# Patient Record
Sex: FEMALE | Race: WHITE | NOT HISPANIC OR LATINO | Employment: OTHER | URBAN - METROPOLITAN AREA
[De-identification: names, ages, dates, MRNs, and addresses within clinical notes are randomized per-mention and may not be internally consistent; named-entity substitution may affect disease eponyms.]

---

## 2020-10-14 ENCOUNTER — TELEPHONE (OUTPATIENT)
Dept: ENDOCRINOLOGY | Facility: CLINIC | Age: 55
End: 2020-10-14

## 2020-10-14 RX ORDER — INSULIN LISPRO 100 [IU]/ML
INJECTION, SUSPENSION SUBCUTANEOUS
Start: 2020-10-14 | End: 2021-01-20

## 2020-10-14 NOTE — TELEPHONE ENCOUNTER
PT STATED SHE WILL START TAKING  35 UNITS OF HUMALOG PER CHIDI RUSSELL. THE PT JUST WANTED TO LET US KNOW.

## 2021-01-20 ENCOUNTER — OFFICE VISIT (OUTPATIENT)
Dept: ENDOCRINOLOGY | Facility: CLINIC | Age: 56
End: 2021-01-20

## 2021-01-20 VITALS — WEIGHT: 162 LBS | HEIGHT: 63 IN | BODY MASS INDEX: 28.7 KG/M2

## 2021-01-20 DIAGNOSIS — E11.65 TYPE 2 DIABETES MELLITUS WITH HYPERGLYCEMIA, WITH LONG-TERM CURRENT USE OF INSULIN (HCC): Primary | Chronic | ICD-10-CM

## 2021-01-20 DIAGNOSIS — E89.0 POSTSURGICAL HYPOTHYROIDISM: Chronic | ICD-10-CM

## 2021-01-20 DIAGNOSIS — R53.83 FATIGUE, UNSPECIFIED TYPE: ICD-10-CM

## 2021-01-20 DIAGNOSIS — Z79.4 TYPE 2 DIABETES MELLITUS WITH HYPERGLYCEMIA, WITH LONG-TERM CURRENT USE OF INSULIN (HCC): Primary | Chronic | ICD-10-CM

## 2021-01-20 PROCEDURE — 99443 PR PHYS/QHP TELEPHONE EVALUATION 21-30 MIN: CPT | Performed by: PHYSICIAN ASSISTANT

## 2021-01-20 RX ORDER — METFORMIN HYDROCHLORIDE 500 MG/1
500 TABLET, EXTENDED RELEASE ORAL 2 TIMES DAILY
Qty: 60 TABLET | Refills: 3 | Status: SHIPPED | OUTPATIENT
Start: 2021-01-20 | End: 2021-02-15 | Stop reason: SINTOL

## 2021-01-20 RX ORDER — PREGABALIN 300 MG/1
300 CAPSULE ORAL 2 TIMES DAILY
COMMUNITY
End: 2021-03-04 | Stop reason: SDUPTHER

## 2021-01-20 RX ORDER — LISINOPRIL 5 MG/1
5 TABLET ORAL DAILY
COMMUNITY

## 2021-01-20 RX ORDER — PROMETHAZINE HYDROCHLORIDE 25 MG/1
25 TABLET ORAL AS NEEDED
COMMUNITY
End: 2022-10-20 | Stop reason: ALTCHOICE

## 2021-01-20 RX ORDER — ISOSORBIDE MONONITRATE 120 MG/1
120 TABLET, EXTENDED RELEASE ORAL DAILY
COMMUNITY

## 2021-01-20 RX ORDER — CHLORTHALIDONE 25 MG/1
12.5 TABLET ORAL DAILY
COMMUNITY

## 2021-01-20 RX ORDER — TRAZODONE HYDROCHLORIDE 150 MG/1
150 TABLET ORAL NIGHTLY
COMMUNITY

## 2021-01-20 RX ORDER — LEVOTHYROXINE SODIUM 112 UG/1
110 TABLET ORAL DAILY
COMMUNITY
End: 2022-10-20 | Stop reason: SDUPTHER

## 2021-01-20 RX ORDER — METOPROLOL TARTRATE 100 MG/1
100 TABLET ORAL 2 TIMES DAILY
COMMUNITY

## 2021-01-20 RX ORDER — INSULIN LISPRO 100 [IU]/ML
INJECTION, SUSPENSION SUBCUTANEOUS
Qty: 30 ML | Refills: 3 | Status: SHIPPED | OUTPATIENT
Start: 2021-01-20 | End: 2021-02-15

## 2021-01-20 NOTE — PROGRESS NOTES
"     Office Note      Date: 2021  Patient Name: Mimi Aguayo  MRN: [id number]  : 1965    Chief Complaint   Patient presents with   • Follow-up   • Diabetes     You have chosen to receive care through a telephone visit. Do you consent to use a telephone visit for your medical care today? Yes    History of Present Illness:   Mimi Aguayo is a 55 y.o. female who presents today for type 2 diabetes and postsurgical hypothyroidism.  She remains on Humalog 75/25.  She is taking 35-38 units twice daily.  She reports that she is testing FSBS 3-4 times per day.  She reports readings have typically been 180-260.  She denies any hypoglycemia.  She stopped drinking beverages with sugar.  She reports that she has improved her diet.  She has decreased carbs.  She reports that she has been trying to stay active and walking near her home.  She reports that she ran out of insulin over the weekend.  She reports that FSBS were better in the 150 range over the weekend when she did not take insulin.  She denies any change in diet over increase in physical activity.  She remains on levothyroxine 112 mcg/day.  She reports taking this correctly and regularly.  She notes fatigue.  She reports history of diarrhea since bowel surgery.  Eye exam due.  Lipids checked with cardiologist.    Subjective      The following portions of the patient's history were reviewed and updated as appropriate: allergies, current medications, past family history, past medical history, past social history, past surgical history and problem list.    Objective     Vitals:    21 1607   Weight: 73.5 kg (162 lb)   Height: 160 cm (63\")   PainSc:   6     Body mass index is 28.7 kg/m².    Physical Exam      Current Outpatient Medications   Medication Instructions   • chlorthalidone (HYGROTON) 12.5 mg, Oral, Daily   • Insulin Lispro Prot & Lispro (HumaLOG Mix 75/25 KwikPen) (75-25) 100 UNIT/ML suspension pen-injector pen 40 units " twice daily with meals   • isosorbide mononitrate (IMDUR) 120 mg, Oral, Daily   • levothyroxine (SYNTHROID, LEVOTHROID) 112 mcg, Oral, Daily   • lisinopril (PRINIVIL,ZESTRIL) 5 mg, Oral, Daily   • metFORMIN ER (GLUCOPHAGE-XR) 500 mg, Oral, 2 times daily   • metoprolol tartrate (LOPRESSOR) 100 mg, Oral, 2 Times Daily   • pregabalin (LYRICA) 300 mg, Oral, 2 Times Daily   • promethazine (PHENERGAN) 25 mg, Oral, As Needed   • traZODone (DESYREL) 150 mg, Oral, Nightly       Assessment / Plan      Assessment & Plan:  1. Type 2 diabetes mellitus with hyperglycemia, with long-term current use of insulin (CMS/[facility])  FSBS increased, above goal.  Encouraged to keep working on healthy dietary choices and regular physical activity.  Trial of metformin ER - start with 1 daily, then increase to 1 twice daily if tolerated.  Continue Humalog 75/25.  Advised to call if not tolerating or blood sugars remain elevated.  - metFORMIN ER (GLUCOPHAGE-XR) 500 MG 24 hr tablet; Take 1 tablet by mouth 2 (two) times a day.  Dispense: 60 tablet; Refill: 3  - Insulin Lispro Prot & Lispro (HumaLOG Mix 75/25 KwikPen) (75-25) 100 UNIT/ML suspension pen-injector pen; 40 units twice daily with meals  Dispense: 30 mL; Refill: 3  - Hemoglobin A1c; Future  - Comprehensive Metabolic Panel; Future    2. Postsurgical hypothyroidism  Continue levothyroxine.  Check TSH.  - levothyroxine (SYNTHROID, LEVOTHROID) 112 MCG tablet; Take 112 mcg by mouth Daily.  - TSH; Future    3. Fatigue, unspecified type  Check labs.  - Vitamin B12; Future  - Folate; Future  - CBC (No Diff); Future    Lab order mailed to have these done near her home.  Will notify her of results.    27 minutes spent on phone with patient.    Return in about 3 months (around 4/20/2021) for Next scheduled follow up.     ADELFO Hartman  01/20/2021

## 2021-02-15 ENCOUNTER — TELEPHONE (OUTPATIENT)
Dept: ENDOCRINOLOGY | Facility: CLINIC | Age: 56
End: 2021-02-15

## 2021-02-15 DIAGNOSIS — E11.65 TYPE 2 DIABETES MELLITUS WITH HYPERGLYCEMIA, WITH LONG-TERM CURRENT USE OF INSULIN (HCC): Chronic | ICD-10-CM

## 2021-02-15 DIAGNOSIS — Z79.4 TYPE 2 DIABETES MELLITUS WITH HYPERGLYCEMIA, WITH LONG-TERM CURRENT USE OF INSULIN (HCC): Chronic | ICD-10-CM

## 2021-02-15 RX ORDER — TRIAMCINOLONE ACETONIDE 5 MG/G
OINTMENT TOPICAL 2 TIMES DAILY
Qty: 15 G | Refills: 0 | Status: SHIPPED | OUTPATIENT
Start: 2021-02-15

## 2021-02-15 RX ORDER — INSULIN LISPRO 100 [IU]/ML
INJECTION, SUSPENSION SUBCUTANEOUS
Start: 2021-02-15 | End: 2022-10-20 | Stop reason: SDUPTHER

## 2021-02-15 NOTE — TELEPHONE ENCOUNTER
See below - where to schedule patient? She would like an appointment next week.  Not able to come in this week due to the weather.

## 2021-02-15 NOTE — TELEPHONE ENCOUNTER
Spoke with patient.  Had left great toenail removed in October.  Nail bed never healed.  She has redness/purple around the toe.  Now her right great toe is swollen, toe is red, draining pus from the nail bed.  Saw Dr. Lovell, podiatry last week.  He drained the toe and she was started on Keflex.  Could not come today due to travel with the weather and requested an appointment next week.  States the podiatrist feels like the problem is stemming from her diabetes.

## 2021-02-15 NOTE — TELEPHONE ENCOUNTER
Spoke with pt.  She reports seeing podiatrist - Dr. Lovell in [place].  She had left 1st toenail removed in October.  She reports this hasn't completely healed yet.  She reports developing infection right 1st toe, base of nail.  She reports that this is draining and has pus.  She reports that she developed rash due to keflex, so she was changed to cipro 4 days ago on Thursday 2/11.  She reports that toe appears dark red/deep purple/brown.  She reports having 'red polka dots' on the bottom of her feet for the past couple of months.  She reports that dermatologist did not know cause.  She reports being diagnosed with 'eczema or psoriasis' on legs by dermatologist one week ago.  She reports that 2 are size of silver dollars, others are smaller.  She reports that clobetasol not covered for her, but pharmacist told her triamcinolone was covered.  She cannot get hold of dermatologist for different Rx and asks if I will send this in.  She reports that she did not tolerate metformin due to diarrhea.  She reports recent readings ac breakfast and ac dinner 160-180. Advised to increase insulin to 44 units BID.  She reports that she is unable to come to [place] this week due to weather.  Will see if I can get her in sooner (this week) to wound care in [place].  They were closed this afternoon, but will try back tomorrow.  Advised to go to ER if symptoms worsens or develops fever.

## 2021-02-15 NOTE — TELEPHONE ENCOUNTER
PT CALLED STATING THAT HER BIG TOES ON BOTH FEET ARE INFECTED BADLY. SHE WANTED TO COME IN NEXT WEEK BUT [facility] IS BOOKED. PLEASE CONFER W/ DR ABOUT BEST COURSE OF ACTION AND REACH OUT TO PT. THANK YOU

## 2021-02-17 ENCOUNTER — TELEPHONE (OUTPATIENT)
Dept: ENDOCRINOLOGY | Facility: CLINIC | Age: 56
End: 2021-02-17

## 2021-02-17 NOTE — TELEPHONE ENCOUNTER
Called patient to check in and see if there had been any change with her feet.  We were out of the office yesterday due to weather.  She reports that right toe is still draining, but clear now instead of pus.  She reports that left toe has not improved.  Called Wound Care at [facility] in [place].  Moved her appointment up for 2 days from now - Friday at 1:30 PM.

## 2021-03-04 DIAGNOSIS — Z79.4 TYPE 2 DIABETES MELLITUS WITH HYPERGLYCEMIA, WITH LONG-TERM CURRENT USE OF INSULIN (HCC): Primary | Chronic | ICD-10-CM

## 2021-03-04 DIAGNOSIS — E11.65 TYPE 2 DIABETES MELLITUS WITH HYPERGLYCEMIA, WITH LONG-TERM CURRENT USE OF INSULIN (HCC): Primary | Chronic | ICD-10-CM

## 2021-03-04 RX ORDER — PREGABALIN 300 MG/1
300 CAPSULE ORAL 2 TIMES DAILY
Qty: 60 CAPSULE | Refills: 1 | Status: SHIPPED | OUTPATIENT
Start: 2021-03-04 | End: 2021-05-07 | Stop reason: SDUPTHER

## 2021-03-25 ENCOUNTER — TELEPHONE (OUTPATIENT)
Dept: ENDOCRINOLOGY | Facility: CLINIC | Age: 56
End: 2021-03-25

## 2021-03-25 NOTE — TELEPHONE ENCOUNTER
PATIENT STATES THAT SHE HAD LAB WORK DONE SEVERAL WEEKS AGO AND HAS NOT HEARD BACK FROM THIS OFFICE WITH THE RESULTS    CALL BACK [phone]

## 2021-03-26 NOTE — TELEPHONE ENCOUNTER
Please let her know that A1c was high at 8.4%.  Ask her to call/send in readings if blood sugars are still staying over 140.  Thyroid level was normal.  Kidney tests, electrolytes, and liver tests were okay.  Vitamin B12 level was on the low side.  Recommend that she start a vitamin B12 supplement of 1000 mcg daily until her next appointment.  She is not anemic.  White blood cell count was elevated - may be due to infection.  I recommend that she follow-up with her primary care doctor about this.  Thanks.

## 2021-05-07 DIAGNOSIS — E11.65 TYPE 2 DIABETES MELLITUS WITH HYPERGLYCEMIA, WITH LONG-TERM CURRENT USE OF INSULIN (HCC): Chronic | ICD-10-CM

## 2021-05-07 DIAGNOSIS — Z79.4 TYPE 2 DIABETES MELLITUS WITH HYPERGLYCEMIA, WITH LONG-TERM CURRENT USE OF INSULIN (HCC): Chronic | ICD-10-CM

## 2021-05-07 RX ORDER — PREGABALIN 300 MG/1
300 CAPSULE ORAL 2 TIMES DAILY
Qty: 60 CAPSULE | Refills: 1 | Status: SHIPPED | OUTPATIENT
Start: 2021-05-07 | End: 2021-07-07

## 2021-07-06 DIAGNOSIS — Z79.4 TYPE 2 DIABETES MELLITUS WITH HYPERGLYCEMIA, WITH LONG-TERM CURRENT USE OF INSULIN (HCC): Chronic | ICD-10-CM

## 2021-07-06 DIAGNOSIS — E11.65 TYPE 2 DIABETES MELLITUS WITH HYPERGLYCEMIA, WITH LONG-TERM CURRENT USE OF INSULIN (HCC): Chronic | ICD-10-CM

## 2021-07-07 RX ORDER — PREGABALIN 300 MG/1
CAPSULE ORAL
Qty: 60 CAPSULE | Refills: 1 | Status: SHIPPED | OUTPATIENT
Start: 2021-07-07 | End: 2021-09-02 | Stop reason: SDUPTHER

## 2021-09-02 DIAGNOSIS — Z79.4 TYPE 2 DIABETES MELLITUS WITH HYPERGLYCEMIA, WITH LONG-TERM CURRENT USE OF INSULIN (HCC): Chronic | ICD-10-CM

## 2021-09-02 DIAGNOSIS — E11.65 TYPE 2 DIABETES MELLITUS WITH HYPERGLYCEMIA, WITH LONG-TERM CURRENT USE OF INSULIN (HCC): Chronic | ICD-10-CM

## 2021-09-02 RX ORDER — PREGABALIN 300 MG/1
CAPSULE ORAL
Qty: 60 CAPSULE | Refills: 0 | Status: SHIPPED | OUTPATIENT
Start: 2021-09-02 | End: 2021-10-04 | Stop reason: SDUPTHER

## 2021-09-02 NOTE — TELEPHONE ENCOUNTER
Last OV 1/20/21 NO Future appt Scheduled  noted on rx Last refill without appt  Vincenzo updated and already scanned into chart

## 2021-10-04 DIAGNOSIS — Z79.4 TYPE 2 DIABETES MELLITUS WITH HYPERGLYCEMIA, WITH LONG-TERM CURRENT USE OF INSULIN (HCC): Chronic | ICD-10-CM

## 2021-10-04 DIAGNOSIS — E11.65 TYPE 2 DIABETES MELLITUS WITH HYPERGLYCEMIA, WITH LONG-TERM CURRENT USE OF INSULIN (HCC): Chronic | ICD-10-CM

## 2021-10-04 RX ORDER — PREGABALIN 300 MG/1
CAPSULE ORAL
Qty: 60 CAPSULE | Refills: 2 | Status: SHIPPED | OUTPATIENT
Start: 2021-10-04 | End: 2022-01-05 | Stop reason: SDUPTHER

## 2021-12-17 ENCOUNTER — TELEPHONE (OUTPATIENT)
Dept: ENDOCRINOLOGY | Facility: CLINIC | Age: 56
End: 2021-12-17

## 2021-12-17 NOTE — TELEPHONE ENCOUNTER
PATIENT CALLED TO REPORT RECENT A1C LEVEL    A1C 6.9     SHE STATES THAT HER THYROID LEVELS ARE 'VERY OFF'. SHE STATES THAT PCP CHANGED HER MEDICATION.     ADVISED PATIENT THAT THIS OFFICE HAS NOT RECEIVED ANY NEW LAB RESULTS. PATIENT STATES THAT RESULTS ARE AT [facility] IN [place].     SHE WOULD LIKE TO BE ADVISED BY THIS OFFICE REGARDING THE NEW CHANGE WITH HER THYROID MEDICATION.

## 2021-12-20 NOTE — TELEPHONE ENCOUNTER
Her tsh was 0.1 and free t4 1.76. her levels are mildly abnormal. We have listed that she is on 112 mcg per day of synthroid. I recommend reducing to 100 mcg per day and then they can be repeated at her appointment with [facility] next month

## 2022-01-05 DIAGNOSIS — Z79.4 TYPE 2 DIABETES MELLITUS WITH HYPERGLYCEMIA, WITH LONG-TERM CURRENT USE OF INSULIN: Chronic | ICD-10-CM

## 2022-01-05 DIAGNOSIS — E11.65 TYPE 2 DIABETES MELLITUS WITH HYPERGLYCEMIA, WITH LONG-TERM CURRENT USE OF INSULIN: Chronic | ICD-10-CM

## 2022-01-05 RX ORDER — PREGABALIN 300 MG/1
CAPSULE ORAL
Qty: 60 CAPSULE | Refills: 0 | Status: SHIPPED | OUTPATIENT
Start: 2022-01-05 | End: 2022-02-09 | Stop reason: SDUPTHER

## 2022-02-09 DIAGNOSIS — Z79.4 TYPE 2 DIABETES MELLITUS WITH HYPERGLYCEMIA, WITH LONG-TERM CURRENT USE OF INSULIN: Chronic | ICD-10-CM

## 2022-02-09 DIAGNOSIS — E11.65 TYPE 2 DIABETES MELLITUS WITH HYPERGLYCEMIA, WITH LONG-TERM CURRENT USE OF INSULIN: Chronic | ICD-10-CM

## 2022-02-09 RX ORDER — PREGABALIN 300 MG/1
CAPSULE ORAL
Qty: 60 CAPSULE | Refills: 5 | Status: SHIPPED | OUTPATIENT
Start: 2022-02-09 | End: 2022-08-16 | Stop reason: SDUPTHER

## 2022-02-09 NOTE — TELEPHONE ENCOUNTER
Patient called requesting a refill on her pregabalin (LYRICA) 300 MG capsule. She needs this sent to [facility] in [place].     Last ov 1/20/21  Follow up scheduled 6/22/22

## 2022-08-15 DIAGNOSIS — E11.65 TYPE 2 DIABETES MELLITUS WITH HYPERGLYCEMIA, WITH LONG-TERM CURRENT USE OF INSULIN: Chronic | ICD-10-CM

## 2022-08-15 DIAGNOSIS — Z79.4 TYPE 2 DIABETES MELLITUS WITH HYPERGLYCEMIA, WITH LONG-TERM CURRENT USE OF INSULIN: Chronic | ICD-10-CM

## 2022-08-15 NOTE — TELEPHONE ENCOUNTER
Last office visit 01/20/2021.  Follow up scheduled for 10/20/2022.  Please advise on refill as patient has not been seen since 01/20/2021.

## 2022-08-16 RX ORDER — PREGABALIN 300 MG/1
CAPSULE ORAL
Qty: 60 CAPSULE | Refills: 1 | Status: SHIPPED | OUTPATIENT
Start: 2022-08-16 | End: 2022-10-21 | Stop reason: SDUPTHER

## 2022-08-16 NOTE — TELEPHONE ENCOUNTER
We can give refills to last until her next appt in 2 months but she has to keep that appt for further refills.

## 2022-08-16 NOTE — TELEPHONE ENCOUNTER
PATIENT RETURNED CALL TO [facility]. ADVISED PATIENT THAT REFILLS UNTIL NEXT SCHEDULED APPT CAN BE SENT IN BUT APPT MUST BE KEPT FOR FURTHER REFILLS. PATIENT VOICED UNDERSTANDING. CONFIRMED UPCOMING APPT WITH PATIENT.

## 2022-10-20 ENCOUNTER — OFFICE VISIT (OUTPATIENT)
Dept: ENDOCRINOLOGY | Facility: CLINIC | Age: 57
End: 2022-10-20

## 2022-10-20 ENCOUNTER — LAB (OUTPATIENT)
Dept: LAB | Facility: HOSPITAL | Age: 57
End: 2022-10-20

## 2022-10-20 VITALS
HEART RATE: 87 BPM | HEIGHT: 63 IN | BODY MASS INDEX: 27.29 KG/M2 | WEIGHT: 154 LBS | DIASTOLIC BLOOD PRESSURE: 68 MMHG | SYSTOLIC BLOOD PRESSURE: 100 MMHG | OXYGEN SATURATION: 95 %

## 2022-10-20 DIAGNOSIS — E11.65 TYPE 2 DIABETES MELLITUS WITH HYPERGLYCEMIA, WITH LONG-TERM CURRENT USE OF INSULIN: Primary | ICD-10-CM

## 2022-10-20 DIAGNOSIS — E11.65 TYPE 2 DIABETES MELLITUS WITH HYPERGLYCEMIA, WITH LONG-TERM CURRENT USE OF INSULIN: ICD-10-CM

## 2022-10-20 DIAGNOSIS — E89.0 POSTSURGICAL HYPOTHYROIDISM: Chronic | ICD-10-CM

## 2022-10-20 DIAGNOSIS — E11.49 TYPE 2 DIABETES MELLITUS WITH NEUROLOGICAL MANIFESTATIONS: ICD-10-CM

## 2022-10-20 DIAGNOSIS — Z79.4 TYPE 2 DIABETES MELLITUS WITH HYPERGLYCEMIA, WITH LONG-TERM CURRENT USE OF INSULIN: ICD-10-CM

## 2022-10-20 DIAGNOSIS — Z79.4 TYPE 2 DIABETES MELLITUS WITH HYPERGLYCEMIA, WITH LONG-TERM CURRENT USE OF INSULIN: Primary | ICD-10-CM

## 2022-10-20 LAB
EXPIRATION DATE: ABNORMAL
EXPIRATION DATE: NORMAL
GLUCOSE BLDC GLUCOMTR-MCNC: 165 MG/DL (ref 70–130)
HBA1C MFR BLD: 7.2 %
Lab: ABNORMAL
Lab: NORMAL

## 2022-10-20 PROCEDURE — 82043 UR ALBUMIN QUANTITATIVE: CPT

## 2022-10-20 PROCEDURE — 3051F HG A1C>EQUAL 7.0%<8.0%: CPT | Performed by: INTERNAL MEDICINE

## 2022-10-20 PROCEDURE — 83036 HEMOGLOBIN GLYCOSYLATED A1C: CPT | Performed by: INTERNAL MEDICINE

## 2022-10-20 PROCEDURE — 80061 LIPID PANEL: CPT

## 2022-10-20 PROCEDURE — 82947 ASSAY GLUCOSE BLOOD QUANT: CPT | Performed by: INTERNAL MEDICINE

## 2022-10-20 PROCEDURE — 84443 ASSAY THYROID STIM HORMONE: CPT

## 2022-10-20 PROCEDURE — 99214 OFFICE O/P EST MOD 30 MIN: CPT | Performed by: INTERNAL MEDICINE

## 2022-10-20 PROCEDURE — 82570 ASSAY OF URINE CREATININE: CPT

## 2022-10-20 PROCEDURE — 80053 COMPREHEN METABOLIC PANEL: CPT

## 2022-10-20 RX ORDER — APREMILAST 30 MG/1
TABLET, FILM COATED ORAL
COMMUNITY
Start: 2022-10-14

## 2022-10-20 RX ORDER — ONDANSETRON 4 MG/1
4 TABLET, ORALLY DISINTEGRATING ORAL
COMMUNITY

## 2022-10-20 RX ORDER — LEVOTHYROXINE SODIUM 112 UG/1
112 TABLET ORAL DAILY
Start: 2022-10-20

## 2022-10-20 RX ORDER — INSULIN LISPRO 100 [IU]/ML
INJECTION, SUSPENSION SUBCUTANEOUS
Qty: 60 ML | Refills: 5
Start: 2022-10-20

## 2022-10-20 RX ORDER — BUSPIRONE HYDROCHLORIDE 15 MG/1
TABLET ORAL
COMMUNITY
Start: 2022-09-21

## 2022-10-20 NOTE — ASSESSMENT & PLAN NOTE
Diabetes is unchanged.  A1c just above goal at 7.2%.  She notes higher glucose during the day.  Continue current treatment regimen.  But increase AM insulin dose.  Diabetes will be reassessed in 3 months.

## 2022-10-20 NOTE — PROGRESS NOTES
Office Note      Date: 10/20/2022  Patient Name: Mimi Aguayo  MRN: [id number]  : 1965    Chief Complaint   Patient presents with   • Diabetes     Type II   • Hypothyroidism       History of Present Illness:   Mimi Aguayo is a 57 y.o. female who presents for Diabetes type 2. Diagnosed in: . Treated in past with insulin. Current treatments: premix insulin. Number of insulin shots per day: 2. Checks blood sugar 3 times a day. Has low blood sugar: occasional. Aspirin use: No -  . Statin use: No -  . ACE-I/ARB use: No -  . Changes in health since last visit: none. Last eye exam 2022.    It has been over a year and 1/2 since her last visit with us.    She remains on T4 112mcg qd.  She is taking this correctly.  She isn't taking any interfering meds concurrently.  She hasn't noted any change in the size of her neck.  She denies any compressive sxs.  She denies any sxs of hypo- or hyperthyroidism at this time, aside from fatigue.      Subjective      Diabetic Complications:  Eyes: No  Kidneys: No  Feet: Yes - neuropathy  Heart: No    Diet and Exercise:  Meals per day: 3  Minutes of exercise per week: 0 mins.    Review of Systems:   Review of Systems   Constitutional: Positive for appetite change, diaphoresis and fatigue.   HENT: Negative.    Eyes: Negative.    Respiratory: Positive for shortness of breath.    Cardiovascular: Negative.    Gastrointestinal: Positive for diarrhea and vomiting.   Endocrine: Positive for polydipsia.   Genitourinary: Negative.    Musculoskeletal: Positive for back pain, gait problem, myalgias and neck stiffness.   Skin: Negative.    Allergic/Immunologic: Negative.    Neurological: Positive for light-headedness and numbness.   Hematological: Negative.    Psychiatric/Behavioral: Negative.        The following portions of the patient's history were reviewed and updated as appropriate: allergies, current medications, past family history, past medical history,  "past social history, past surgical history and problem list.    Objective       Visit Vitals  /68 (BP Location: Left arm, Patient Position: Sitting, Cuff Size: Adult)   Pulse 87   Ht 160 cm (63\")   Wt 69.9 kg (154 lb)   SpO2 95%   BMI 27.28 kg/m²       Physical Exam:  Physical Exam  Constitutional:       Appearance: Normal appearance.   Neck:      Thyroid: No thyroid mass, thyromegaly or thyroid tenderness.   Cardiovascular:      Pulses:           Dorsalis pedis pulses are 1+ on the right side and 1+ on the left side.        Posterior tibial pulses are 1+ on the right side and 1+ on the left side.   Feet:      Right foot:      Protective Sensation: 5 sites tested. 2 sites sensed.      Skin integrity: Erythema present.      Toenail Condition: Right toenails are abnormally thick. Fungal disease present.     Left foot:      Protective Sensation: 5 sites tested. 2 sites sensed.      Skin integrity: Erythema present.      Toenail Condition: Left toenails are abnormally thick. Fungal disease present.     Comments: Insensate to monofilament in both feet  Lymphadenopathy:      Cervical: No cervical adenopathy.   Neurological:      Mental Status: She is alert.         Labs:    HbA1c  Lab Results   Component Value Date    HGBA1C 7.2 10/20/2022       CMP  No results found for: GLUCOSE, BUN, CREATININE, EGFRIFNONA, EGFRIFAFRI, BCR, K, CO2, CALCIUM, PROTENTOTREF, LABIL2, BILIRUBIN, AST, ALT     Lipid Panel        TSH  No results found for: TSH, FREET4     Hemoglobin A1C  Lab Results   Component Value Date    HGBA1C 7.2 10/20/2022        Microalbumin/Creatinine  No results found for: MALBCRERATIO, CREATINIURIN, MICROALBUR        Assessment / Plan      Assessment & Plan:  Diagnoses and all orders for this visit:    1. Type 2 diabetes mellitus with hyperglycemia, with long-term current use of insulin (HCC) (Primary)  Assessment & Plan:  Diabetes is unchanged.  A1c just above goal at 7.2%.  She notes higher glucose during the " day.  Continue current treatment regimen.  But increase AM insulin dose.  Diabetes will be reassessed in 3 months.    Orders:  -     POC Glucose, Blood  -     POC Glycosylated Hemoglobin (Hb A1C)  -     Comprehensive Metabolic Panel; Future  -     Lipid Panel; Future  -     Microalbumin / Creatinine Urine Ratio - Urine, Clean Catch; Future  -     TSH; Future  -     Insulin Lispro Prot & Lispro (HumaLOG Mix 75/25 KwikPen) (75-25) 100 UNIT/ML suspension pen-injector pen; 50u sq q AM and 44u sq q PM  Dispense: 60 mL; Refill: 5    2. Postsurgical hypothyroidism  Assessment & Plan:  Continue T4.  Check TSH today.  Will send note about results.    Orders:  -     levothyroxine (SYNTHROID, LEVOTHROID) 112 MCG tablet; Take 1 tablet by mouth Daily.    3. Type 2 diabetes mellitus with neurological manifestations (HCC)  Assessment & Plan:  On pregabalin per PCP.  We discussed OTC B complex also.        Return in about 3 months (around 1/20/2023) for Recheck with A1c, TSH.    Zack Locke MD   10/20/2022

## 2022-10-21 DIAGNOSIS — Z79.4 TYPE 2 DIABETES MELLITUS WITH HYPERGLYCEMIA, WITH LONG-TERM CURRENT USE OF INSULIN: Chronic | ICD-10-CM

## 2022-10-21 DIAGNOSIS — E11.65 TYPE 2 DIABETES MELLITUS WITH HYPERGLYCEMIA, WITH LONG-TERM CURRENT USE OF INSULIN: Chronic | ICD-10-CM

## 2022-10-21 LAB
ALBUMIN SERPL-MCNC: 4.3 G/DL (ref 3.5–5.2)
ALBUMIN UR-MCNC: <1.2 MG/DL
ALBUMIN/GLOB SERPL: 1.8 G/DL
ALP SERPL-CCNC: 66 U/L (ref 39–117)
ALT SERPL W P-5'-P-CCNC: 9 U/L (ref 1–33)
ANION GAP SERPL CALCULATED.3IONS-SCNC: 13.3 MMOL/L (ref 5–15)
AST SERPL-CCNC: 19 U/L (ref 1–32)
BILIRUB SERPL-MCNC: <0.2 MG/DL (ref 0–1.2)
BUN SERPL-MCNC: 10 MG/DL (ref 6–20)
BUN/CREAT SERPL: 11.6 (ref 7–25)
CALCIUM SPEC-SCNC: 9.9 MG/DL (ref 8.6–10.5)
CHLORIDE SERPL-SCNC: 100 MMOL/L (ref 98–107)
CHOLEST SERPL-MCNC: 205 MG/DL (ref 0–200)
CO2 SERPL-SCNC: 25.7 MMOL/L (ref 22–29)
CREAT SERPL-MCNC: 0.86 MG/DL (ref 0.57–1)
CREAT UR-MCNC: 70.8 MG/DL
EGFRCR SERPLBLD CKD-EPI 2021: 78.9 ML/MIN/1.73
GLOBULIN UR ELPH-MCNC: 2.4 GM/DL
GLUCOSE SERPL-MCNC: 135 MG/DL (ref 65–99)
HDLC SERPL-MCNC: 36 MG/DL (ref 40–60)
LDLC SERPL CALC-MCNC: 105 MG/DL (ref 0–100)
LDLC/HDLC SERPL: 2.61 {RATIO}
MICROALBUMIN/CREAT UR: NORMAL MG/G{CREAT}
POTASSIUM SERPL-SCNC: 5 MMOL/L (ref 3.5–5.2)
PROT SERPL-MCNC: 6.7 G/DL (ref 6–8.5)
SODIUM SERPL-SCNC: 139 MMOL/L (ref 136–145)
TRIGL SERPL-MCNC: 375 MG/DL (ref 0–150)
TSH SERPL DL<=0.05 MIU/L-ACNC: 0.51 UIU/ML (ref 0.27–4.2)
VLDLC SERPL-MCNC: 64 MG/DL (ref 5–40)

## 2022-10-21 RX ORDER — PREGABALIN 300 MG/1
CAPSULE ORAL
Qty: 60 CAPSULE | Refills: 5 | Status: SHIPPED | OUTPATIENT
Start: 2022-10-21

## 2022-10-21 NOTE — TELEPHONE ENCOUNTER
Last office visit 10/20/2022.  Follow up scheduled for 03/02/2023.    Vincenzo placed in Dr. Locke box

## 2023-04-20 DIAGNOSIS — E11.65 TYPE 2 DIABETES MELLITUS WITH HYPERGLYCEMIA, WITH LONG-TERM CURRENT USE OF INSULIN: Chronic | ICD-10-CM

## 2023-04-20 DIAGNOSIS — Z79.4 TYPE 2 DIABETES MELLITUS WITH HYPERGLYCEMIA, WITH LONG-TERM CURRENT USE OF INSULIN: Chronic | ICD-10-CM

## 2023-04-21 DIAGNOSIS — E11.65 TYPE 2 DIABETES MELLITUS WITH HYPERGLYCEMIA, WITH LONG-TERM CURRENT USE OF INSULIN: Chronic | ICD-10-CM

## 2023-04-21 DIAGNOSIS — Z79.4 TYPE 2 DIABETES MELLITUS WITH HYPERGLYCEMIA, WITH LONG-TERM CURRENT USE OF INSULIN: Chronic | ICD-10-CM

## 2023-04-21 RX ORDER — PREGABALIN 300 MG/1
CAPSULE ORAL
Qty: 60 CAPSULE | Refills: 2 | Status: SHIPPED | OUTPATIENT
Start: 2023-04-21

## 2023-04-21 RX ORDER — PREGABALIN 300 MG/1
CAPSULE ORAL
Qty: 60 CAPSULE | Refills: 2 | Status: SHIPPED | OUTPATIENT
Start: 2023-04-21 | End: 2023-04-21 | Stop reason: SDUPTHER

## 2023-04-21 NOTE — TELEPHONE ENCOUNTER
Rx Refill Note  Requested Prescriptions     Pending Prescriptions Disp Refills   • pregabalin (LYRICA) 300 MG capsule [Pharmacy Med Name: PREGABALIN 300 MG CAPS 300 Capsule] 60 capsule 5     Sig: TAKE 1 CAPSULE BY MOUTH 2 TIMES DAILY      Last office visit with prescribing clinician: 10/20/2022     Next office visit with prescribing clinician: 7/13//2023

## 2023-04-24 ENCOUNTER — TELEPHONE (OUTPATIENT)
Dept: ENDOCRINOLOGY | Facility: CLINIC | Age: 58
End: 2023-04-24
Payer: MEDICARE

## 2023-04-24 NOTE — TELEPHONE ENCOUNTER
Patient called the office, stated that she wanted to make us aware that she applied through [employer] for a freestyle jose miguel. She stated that they would be reaching out to the office regarding ordering her one. She wanted to let us know. Thank you

## 2023-07-25 DIAGNOSIS — Z79.4 TYPE 2 DIABETES MELLITUS WITH HYPERGLYCEMIA, WITH LONG-TERM CURRENT USE OF INSULIN: Chronic | ICD-10-CM

## 2023-07-25 DIAGNOSIS — E11.65 TYPE 2 DIABETES MELLITUS WITH HYPERGLYCEMIA, WITH LONG-TERM CURRENT USE OF INSULIN: Chronic | ICD-10-CM

## 2023-07-25 RX ORDER — PREGABALIN 300 MG/1
CAPSULE ORAL
Qty: 60 CAPSULE | Refills: 5 | Status: CANCELLED | OUTPATIENT
Start: 2023-07-25

## 2024-01-15 DIAGNOSIS — E11.65 TYPE 2 DIABETES MELLITUS WITH HYPERGLYCEMIA, WITH LONG-TERM CURRENT USE OF INSULIN: Chronic | ICD-10-CM

## 2024-01-15 DIAGNOSIS — Z79.4 TYPE 2 DIABETES MELLITUS WITH HYPERGLYCEMIA, WITH LONG-TERM CURRENT USE OF INSULIN: Chronic | ICD-10-CM

## 2024-01-15 NOTE — TELEPHONE ENCOUNTER
Rx Refill Note  Requested Prescriptions     Pending Prescriptions Disp Refills    pregabalin (LYRICA) 300 MG capsule [Pharmacy Med Name: PREGABALIN 300 MG CAPS 300 Capsule] 60 capsule 5     Sig: TAKE 1 CAPSULE BY MOUTH 2 TIMES A DAY          Last office visit with prescribing clinician: 10/20/2022     Next office visit with prescribing clinician: 5/30/2024         Keli Chavarria MA  01/15/24, 15:14 EST

## 2024-01-15 NOTE — TELEPHONE ENCOUNTER
Rx Refill Note  Requested Prescriptions     Pending Prescriptions Disp Refills    pregabalin (LYRICA) 300 MG capsule [Pharmacy Med Name: PREGABALIN 300 MG CAPS 300 Capsule] 60 capsule 5     Sig: TAKE 1 CAPSULE BY MOUTH 2 TIMES A DAY          Last office visit with prescribing clinician: 10/20/2022     Next office visit with prescribing clinician: 5/30/2024         Keli Chavarria MA  01/15/24, 13:30 EST

## 2024-01-16 RX ORDER — PREGABALIN 300 MG/1
CAPSULE ORAL
Qty: 60 CAPSULE | Refills: 5 | Status: SHIPPED | OUTPATIENT
Start: 2024-01-16

## 2024-01-25 DIAGNOSIS — E11.65 TYPE 2 DIABETES MELLITUS WITH HYPERGLYCEMIA, WITH LONG-TERM CURRENT USE OF INSULIN: Chronic | ICD-10-CM

## 2024-01-25 DIAGNOSIS — Z79.4 TYPE 2 DIABETES MELLITUS WITH HYPERGLYCEMIA, WITH LONG-TERM CURRENT USE OF INSULIN: Chronic | ICD-10-CM

## 2024-01-26 RX ORDER — PREGABALIN 300 MG/1
CAPSULE ORAL
Qty: 60 CAPSULE | Refills: 5 | OUTPATIENT
Start: 2024-01-26

## 2024-06-27 ENCOUNTER — TELEPHONE (OUTPATIENT)
Dept: ENDOCRINOLOGY | Facility: CLINIC | Age: 59
End: 2024-06-27
Payer: MEDICARE

## 2024-06-27 NOTE — TELEPHONE ENCOUNTER
Lyrica refill request.  Last office visit was 10/20/2022.  Tried to call patient and there was no answer.  Left message to return call.  Will need appointment for additional refills.

## 2024-07-16 ENCOUNTER — DOCUMENTATION (OUTPATIENT)
Dept: ENDOCRINOLOGY | Facility: CLINIC | Age: 59
End: 2024-07-16

## 2024-07-16 ENCOUNTER — OFFICE VISIT (OUTPATIENT)
Dept: ENDOCRINOLOGY | Facility: CLINIC | Age: 59
End: 2024-07-16
Payer: MEDICARE

## 2024-07-16 VITALS
BODY MASS INDEX: 27.64 KG/M2 | SYSTOLIC BLOOD PRESSURE: 122 MMHG | HEART RATE: 90 BPM | DIASTOLIC BLOOD PRESSURE: 76 MMHG | WEIGHT: 156 LBS | OXYGEN SATURATION: 94 % | HEIGHT: 63 IN

## 2024-07-16 DIAGNOSIS — E11.65 TYPE 2 DIABETES MELLITUS WITH HYPERGLYCEMIA, WITH LONG-TERM CURRENT USE OF INSULIN: Primary | ICD-10-CM

## 2024-07-16 DIAGNOSIS — E89.0 POSTSURGICAL HYPOTHYROIDISM: Chronic | ICD-10-CM

## 2024-07-16 DIAGNOSIS — Z79.4 TYPE 2 DIABETES MELLITUS WITH HYPERGLYCEMIA, WITH LONG-TERM CURRENT USE OF INSULIN: Primary | ICD-10-CM

## 2024-07-16 DIAGNOSIS — E11.49 TYPE 2 DIABETES MELLITUS WITH NEUROLOGICAL MANIFESTATIONS: ICD-10-CM

## 2024-07-16 LAB
ALBUMIN SERPL-MCNC: 4.2 G/DL (ref 3.5–5.2)
ALBUMIN UR-MCNC: <1.2 MG/DL
ALBUMIN/GLOB SERPL: 1.4 G/DL
ALP SERPL-CCNC: 70 U/L (ref 39–117)
ALT SERPL W P-5'-P-CCNC: 11 U/L (ref 1–33)
ANION GAP SERPL CALCULATED.3IONS-SCNC: 12.5 MMOL/L (ref 5–15)
AST SERPL-CCNC: 24 U/L (ref 1–32)
BILIRUB SERPL-MCNC: 0.4 MG/DL (ref 0–1.2)
BUN SERPL-MCNC: 9 MG/DL (ref 6–20)
BUN/CREAT SERPL: 10.1 (ref 7–25)
CALCIUM SPEC-SCNC: 9.5 MG/DL (ref 8.6–10.5)
CHLORIDE SERPL-SCNC: 104 MMOL/L (ref 98–107)
CO2 SERPL-SCNC: 24.5 MMOL/L (ref 22–29)
CREAT SERPL-MCNC: 0.89 MG/DL (ref 0.57–1)
CREAT UR-MCNC: 45.1 MG/DL
EGFRCR SERPLBLD CKD-EPI 2021: 75.3 ML/MIN/1.73
EXPIRATION DATE: ABNORMAL
EXPIRATION DATE: NORMAL
GLOBULIN UR ELPH-MCNC: 2.9 GM/DL
GLUCOSE BLDC GLUCOMTR-MCNC: 92 MG/DL (ref 70–130)
GLUCOSE SERPL-MCNC: 86 MG/DL (ref 65–99)
HBA1C MFR BLD: 7.1 % (ref 4.5–5.7)
Lab: ABNORMAL
Lab: NORMAL
MICROALBUMIN/CREAT UR: NORMAL MG/G{CREAT}
POTASSIUM SERPL-SCNC: 4.2 MMOL/L (ref 3.5–5.2)
PROT SERPL-MCNC: 7.1 G/DL (ref 6–8.5)
SODIUM SERPL-SCNC: 141 MMOL/L (ref 136–145)
TSH SERPL DL<=0.05 MIU/L-ACNC: 1.55 UIU/ML (ref 0.27–4.2)

## 2024-07-16 PROCEDURE — 80053 COMPREHEN METABOLIC PANEL: CPT | Performed by: INTERNAL MEDICINE

## 2024-07-16 PROCEDURE — 82570 ASSAY OF URINE CREATININE: CPT | Performed by: INTERNAL MEDICINE

## 2024-07-16 PROCEDURE — 82043 UR ALBUMIN QUANTITATIVE: CPT | Performed by: INTERNAL MEDICINE

## 2024-07-16 PROCEDURE — 84443 ASSAY THYROID STIM HORMONE: CPT | Performed by: INTERNAL MEDICINE

## 2024-07-16 RX ORDER — CLONAZEPAM 0.5 MG/1
TABLET ORAL
COMMUNITY
Start: 2024-06-28

## 2024-07-16 RX ORDER — PEN NEEDLE, DIABETIC 31 GX5/16"
NEEDLE, DISPOSABLE MISCELLANEOUS
COMMUNITY
Start: 2024-05-07 | End: 2024-07-16 | Stop reason: SDUPTHER

## 2024-07-16 RX ORDER — KETOROLAC TROMETHAMINE 30 MG/ML
INJECTION, SOLUTION INTRAMUSCULAR; INTRAVENOUS SEE ADMIN INSTRUCTIONS
COMMUNITY
Start: 2024-04-12

## 2024-07-16 RX ORDER — LEVOTHYROXINE SODIUM 88 UG/1
1 TABLET ORAL DAILY
COMMUNITY
Start: 2024-06-15

## 2024-07-16 RX ORDER — IPRATROPIUM BROMIDE AND ALBUTEROL 20; 100 UG/1; UG/1
SPRAY, METERED RESPIRATORY (INHALATION)
COMMUNITY
Start: 2024-07-05

## 2024-07-16 RX ORDER — BLOOD-GLUCOSE SENSOR
EACH MISCELLANEOUS
COMMUNITY
Start: 2024-06-28

## 2024-07-16 RX ORDER — FLUTICASONE FUROATE, UMECLIDINIUM BROMIDE AND VILANTEROL TRIFENATATE 200; 62.5; 25 UG/1; UG/1; UG/1
1 POWDER RESPIRATORY (INHALATION) DAILY
COMMUNITY
Start: 2024-06-27

## 2024-07-16 RX ORDER — INSULIN DEGLUDEC 100 U/ML
55 INJECTION, SOLUTION SUBCUTANEOUS DAILY
Qty: 51 ML | Refills: 3 | Status: SHIPPED | OUTPATIENT
Start: 2024-07-16

## 2024-07-16 RX ORDER — INSULIN ASPART 100 [IU]/ML
INJECTION, SOLUTION INTRAVENOUS; SUBCUTANEOUS
Qty: 45 ML | Refills: 3 | Status: SHIPPED | OUTPATIENT
Start: 2024-07-16

## 2024-07-16 RX ORDER — PEN NEEDLE, DIABETIC 31 GX5/16"
NEEDLE, DISPOSABLE MISCELLANEOUS
Qty: 400 EACH | Refills: 3 | Status: SHIPPED | OUTPATIENT
Start: 2024-07-16

## 2024-07-16 NOTE — PROGRESS NOTES
"     Office Note      Date: 2024  Patient Name: Daniel Rodriguez  MRN: [id number]  : 1965    Chief Complaint   Patient presents with    Diabetes       History of Present Illness:   Mimi Rodriguez is a 58 y.o. female who presents for Diabetes type 2. Diagnosed in: . Treated in past with insulin. Current treatments: premix insulin. Number of insulin shots per day: 2. Checks blood sugar 288 times a day - on FreeStyle Rufino 3. Has low blood sugar: occasional. Aspirin use: No. Statin use: No. ACE-I/ARB use: No. Changes in health since last visit: none. Last eye exam 2024.     It has been almost 2 years since her last visit with us.     She remains on T4 88mcg qd.  She is taking this correctly.  She isn't taking any interfering meds concurrently.  She hasn't noted any change in the size of her neck.  She denies any compressive sxs.  She denies any sxs of hypo- or hyperthyroidism at this time, aside from fatigue.    Subjective      Diabetic Complications:  Eyes: No  Kidneys: No  Feet: Yes - neuropathy  Heart: No    Diet and Exercise:  Meals per day: 3  Minutes of exercise per week: 0 mins.    Review of Systems:   Review of Systems   Constitutional: Negative.    Cardiovascular: Negative.    Gastrointestinal: Negative.    Endocrine: Negative.        The following portions of the patient's history were reviewed and updated as appropriate: allergies, current medications, past family history, past medical history, past social history, past surgical history, and problem list.    Objective     Visit Vitals  /76   Pulse 90   Ht 160 cm (63\")   Wt 70.8 kg (156 lb)   SpO2 94%   BMI 27.63 kg/m²       Physical Exam:  Physical Exam  Constitutional:       Appearance: Normal appearance.   Cardiovascular:      Pulses:           Dorsalis pedis pulses are 1+ on the right side and 1+ on the left side.        Posterior tibial pulses are 1+ on the right side and 1+ on the left side.   Feet:      Right foot:     "  Protective Sensation: 5 sites tested.  5 sites sensed.      Skin integrity: Dry skin present.      Toenail Condition: Right toenails are abnormally thick. Fungal disease present.     Left foot:      Protective Sensation: 5 sites tested.  2 sites sensed.      Skin integrity: Dry skin present.      Toenail Condition: Left toenails are abnormally thick. Fungal disease present.     Comments: Dyshydrotic eczema?  Neurological:      Mental Status: She is alert.         Labs:    HbA1c  Lab Results   Component Value Date    HGBA1C 7.1 (A) 07/16/2024       CMP  Lab Results   Component Value Date    GLUCOSE 135 (H) 10/20/2022    BUN 10 10/20/2022    CREATININE 0.86 10/20/2022    BCR 11.6 10/20/2022    K 5.0 10/20/2022    CO2 25.7 10/20/2022    CALCIUM 9.9 10/20/2022    AST 19 10/20/2022    ALT 9 10/20/2022        Lipid Panel  Lab Results   Component Value Date    HDL 36 (L) 10/20/2022     (H) 10/20/2022    TRIG 375 (H) 10/20/2022        TSH  Lab Results   Component Value Date    TSH 0.508 10/20/2022        Hemoglobin A1C  Lab Results   Component Value Date    HGBA1C 7.1 (A) 07/16/2024        Microalbumin/Creatinine  Lab Results   Component Value Date    MALBCRERATIO  10/20/2022      Comment:      Unable to calculate    MICROALBUR <1.2 10/20/2022           Assessment / Plan      Assessment & Plan:  Diagnoses and all orders for this visit:    1. Type 2 diabetes mellitus with hyperglycemia, with long-term current use of insulin (Primary)  Assessment & Plan:  Diabetes is stable.  A1c not too bad at 7.1%.  Having a fair amount of glucose variability.  We discussed treatment options.   We discussed basal bolus insulin vs insulin pumps.  Our diabetes educator discussed these further with the patient.  The patient is considering insulin pump therapy but wanted to try basal bolus insulin first.  Will send Rx for these insulins and stop the premix insulin..    Diabetes will be reassessed in 3 months.    Orders:  -     POC  Glucose, Blood  -     POC Glycosylated Hemoglobin (Hb A1C)  -     Comprehensive Metabolic Panel; Future  -     TSH; Future  -     Microalbumin / Creatinine Urine Ratio - Urine, Clean Catch; Future  -     Comprehensive Metabolic Panel  -     TSH  -     Microalbumin / Creatinine Urine Ratio - Urine, Clean Catch    2. Postsurgical hypothyroidism  Assessment & Plan:  Continue T4 tx.  Check TSH.      3. Type 2 diabetes mellitus with neurological manifestations  Assessment & Plan:  On pregabalin per her PCP.      Other orders  -     insulin degludec (Tresiba FlexTouch) 100 UNIT/ML solution pen-injector injection; Inject 55 Units under the skin into the appropriate area as directed Daily.  Dispense: 51 mL; Refill: 3  -     insulin aspart (NovoLOG FlexPen) 100 UNIT/ML solution pen-injector sc pen; Inject 1u per 5g CHO TID with meals plus correction of 1u per 50mg/dL over 150; max daily dose 50u.  Dispense: 45 mL; Refill: 3  -     Easy Touch Pen Needles 31G X 8 MM misc; Use 4x per day.  Dispense: 400 each; Refill: 3      Current Outpatient Medications   Medication Instructions    clonazePAM (KlonoPIN) 0.5 MG tablet take 1 tablet by mouth 2 times daily as needed for anxiety    Combivent Respimat  MCG/ACT inhaler INHALE 1 PUFF BY MOUTH EVERY 6 HOURS    Continuous Glucose  (FreeStyle Rufino 3 [place]) device Oral, See Admin Instructions, Take as directed on package    Continuous Glucose Sensor (FreeStyle Rufino 3 Sensor) misc USE AS DIRECTED. CHANGE EVERY 14 DAYS    Easy Touch Pen Needles 31G X 8 MM misc Use 4x per day.    insulin aspart (NovoLOG FlexPen) 100 UNIT/ML solution pen-injector sc pen Inject 1u per 5g CHO TID with meals plus correction of 1u per 50mg/dL over 150; max daily dose 50u.    isosorbide mononitrate (IMDUR) 120 mg, Oral, Daily    levothyroxine (SYNTHROID, LEVOTHROID) 88 MCG tablet 1 tablet, Oral, Daily    metoprolol tartrate (LOPRESSOR) 100 mg, Oral, 2 Times Daily    Otezla 30 MG tablet No dose,  route, or frequency recorded.    pregabalin (LYRICA) 300 MG capsule TAKE 1 CAPSULE BY MOUTH 2 TIMES A DAY    traZODone (DESYREL) 150 mg, Oral, Nightly    Trelegy Ellipta 200-62.5-25 MCG/ACT inhaler 1 puff, Inhalation, Daily    Tresiba FlexTouch 55 Units, Subcutaneous, Daily    triamcinolone (KENALOG) 0.5 % ointment Topical, 2 Times Daily      Return in about 3 months (around 10/16/2024) for Recheck with A1c.    Electronically signed by: Zack Locke MD  07/16/2024

## 2024-07-16 NOTE — ASSESSMENT & PLAN NOTE
Diabetes is stable.  A1c not too bad at 7.1%.  Having a fair amount of glucose variability.  We discussed treatment options.   We discussed basal bolus insulin vs insulin pumps.  Our diabetes educator discussed these further with the patient.  The patient is considering insulin pump therapy but wanted to try basal bolus insulin first.  Will send Rx for these insulins and stop the premix insulin..    Diabetes will be reassessed in 3 months.

## 2024-07-16 NOTE — PROGRESS NOTES
Met with pt to review basics of basal/bolus and discuss potential pump therapy. Pt would like to first advance to MDI from mixed insulin. She will consider pump therapy in the future - not interested in tubing, only OP5.

## 2024-07-17 ENCOUNTER — TELEPHONE (OUTPATIENT)
Dept: ENDOCRINOLOGY | Facility: CLINIC | Age: 59
End: 2024-07-17
Payer: MEDICARE

## 2024-07-17 RX ORDER — INSULIN LISPRO 100 [IU]/ML
INJECTION, SOLUTION INTRAVENOUS; SUBCUTANEOUS
Qty: 45 ML | Refills: 1 | Status: SHIPPED | OUTPATIENT
Start: 2024-07-17

## 2024-07-17 NOTE — TELEPHONE ENCOUNTER
[facility] called stated that patients novolog is non formulary. Stated they believe insurance is wanting humalog. Needing a new script. They would like a call back.

## 2024-07-22 ENCOUNTER — TELEPHONE (OUTPATIENT)
Dept: ENDOCRINOLOGY | Facility: CLINIC | Age: 59
End: 2024-07-22
Payer: MEDICARE

## 2024-07-22 ENCOUNTER — PRIOR AUTHORIZATION (OUTPATIENT)
Dept: ENDOCRINOLOGY | Facility: CLINIC | Age: 59
End: 2024-07-22
Payer: MEDICARE

## 2024-07-22 RX ORDER — INSULIN PMP CART,AUT,G6/7,CNTR
1 EACH SUBCUTANEOUS SEE ADMIN INSTRUCTIONS
Qty: 1 KIT | Refills: 0 | Status: SHIPPED | OUTPATIENT
Start: 2024-07-22

## 2024-07-22 RX ORDER — INSULIN ASPART 100 [IU]/ML
INJECTION, SOLUTION INTRAVENOUS; SUBCUTANEOUS
Qty: 30 ML | Refills: 1 | Status: SHIPPED | OUTPATIENT
Start: 2024-07-22

## 2024-07-22 RX ORDER — PROCHLORPERAZINE 25 MG/1
SUPPOSITORY RECTAL
Qty: 3 EACH | Refills: 1 | Status: SHIPPED | OUTPATIENT
Start: 2024-07-22

## 2024-07-22 RX ORDER — PROCHLORPERAZINE 25 MG/1
1 SUPPOSITORY RECTAL
Qty: 1 EACH | Refills: 0 | Status: SHIPPED | OUTPATIENT
Start: 2024-07-22

## 2024-07-22 NOTE — TELEPHONE ENCOUNTER
Hub staff attempted to follow warm transfer process and was unsuccessful     Caller: Daniel Rodriguez    Relationship to patient: Self    Best call back number: [phone]     Patient is needing: PHARMACY NEEDS PA, FAXED US THE PAPERWORK. WANTS TO BE CALLED BACK IF ANYTHING ELSE IS NEEDED. OK TO M.

## 2024-07-22 NOTE — TELEPHONE ENCOUNTER
Caller: Daniel Rodriguez    Relationship to patient: Self    Best call back number: [phone]    Patient is needing: PT WOULD LIKE TO SWITCH TO THE OMNIPOD THAT RELEASES HER INSULIN DEPENDING ON HER BLOOD GLUCOSE LEVEL. PLEASE CALL THE PT WITH AN UPDATE.

## 2024-07-22 NOTE — TELEPHONE ENCOUNTER
Approvedtoday  PA Case: [id number], Status: Approved, Coverage Starts on: 4/22/2024 12:00:00 AM, Coverage Ends on: 7/22/2025 12:00:00 AM.  Drug  Dexcom G6 Transmitter  Form  [employer] Electronic PA Form (2017 NCPDP)    Available without authorization. The member is able to fill the requested drug at the pharmacy. If coverage is still needed or requesting prior to the expiration of a current authorization, a request can be made by sending a fax or calling the number on the back of the member's ID card.  Drug  Dexcom G6 Sensor  Form  [place] Medicare Electronic PA Form (2017 NCPDP)

## 2024-07-24 DIAGNOSIS — E11.65 TYPE 2 DIABETES MELLITUS WITH HYPERGLYCEMIA, WITH LONG-TERM CURRENT USE OF INSULIN: Chronic | ICD-10-CM

## 2024-07-24 DIAGNOSIS — Z79.4 TYPE 2 DIABETES MELLITUS WITH HYPERGLYCEMIA, WITH LONG-TERM CURRENT USE OF INSULIN: Chronic | ICD-10-CM

## 2024-07-24 NOTE — TELEPHONE ENCOUNTER
Caller: Rodriguez Daniel Yuko    Relationship: Self    Best call back number: [phone]     Requested Prescriptions:   Requested Prescriptions     Pending Prescriptions Disp Refills    pregabalin (LYRICA) 300 MG capsule 60 capsule 5     Sig: TAKE 1 CAPSULE BY MOUTH 2 TIMES A DAY        [facility]     Last office visit with prescribing clinician: 7/16/2024   Last telemedicine visit with prescribing clinician: Visit date not found   Next office visit with prescribing clinician: Visit date not found     Additional details provided by patient: pt wondering about PA for omnipod and dexcom please advise.     Does the patient have less than a 3 day supply:  [] Yes  [x] No    Would you like a call back once the refill request has been completed: [] Yes [x] No    If the office needs to give you a call back, can they leave a voicemail: [] Yes [x] No    Ladan Banda   07/24/24 12:48 EDT

## 2024-07-25 ENCOUNTER — PRIOR AUTHORIZATION (OUTPATIENT)
Dept: ENDOCRINOLOGY | Facility: CLINIC | Age: 59
End: 2024-07-25
Payer: MEDICARE

## 2024-07-25 RX ORDER — PREGABALIN 300 MG/1
CAPSULE ORAL
Qty: 60 CAPSULE | Refills: 4 | Status: SHIPPED | OUTPATIENT
Start: 2024-07-25

## 2024-07-25 NOTE — TELEPHONE ENCOUNTER
Approvedtoday  PA Case: [id number], Status: Approved, Coverage Starts on: 4/26/2024 12:00:00 AM, Coverage Ends on: 7/25/2025 12:00:00 AM.  Drug  Omnipod 5 G6 Intro (Gen 5) kit  Form  [employer] Electronic PA Form (2017 NCPDP)

## 2024-07-29 ENCOUNTER — TELEPHONE (OUTPATIENT)
Dept: ENDOCRINOLOGY | Facility: CLINIC | Age: 59
End: 2024-07-29

## 2024-07-29 NOTE — TELEPHONE ENCOUNTER
Caller: Daniel Rodriguez    Relationship to patient: Self    Best call back number: [phone]    Patient is needing: PT IS HAVING TROUBLE WITH HER TRESIBA INSULIN. SHE STOPPED TAKING IT. PLEASE CALL PT TO ADVISE.

## 2024-07-29 NOTE — TELEPHONE ENCOUNTER
Spoke with patient.  She states she stopped all of her insulin on Friday.  Blood sugars were dropping to 47-50.  States she did better when she was on mixed insulin.  Blood sugar at time of call is 150 fasting.

## 2024-07-29 NOTE — TELEPHONE ENCOUNTER
Hub staff attempted to follow warm transfer process and was unsuccessful     Caller: Daniel Rodriguez    Relationship to patient: Self    Best call back number: [phone]    Patient is needing: PT RETURN MANDY'S PHONE CALL ASKED FOR A CALL BACK

## 2024-08-19 ENCOUNTER — TELEPHONE (OUTPATIENT)
Dept: ENDOCRINOLOGY | Facility: CLINIC | Age: 59
End: 2024-08-19
Payer: MEDICARE

## 2024-08-19 RX ORDER — PROCHLORPERAZINE 25 MG/1
1 SUPPOSITORY RECTAL SEE ADMIN INSTRUCTIONS
Qty: 1 EACH | Refills: 0 | Status: SHIPPED | OUTPATIENT
Start: 2024-08-19

## 2024-08-19 NOTE — TELEPHONE ENCOUNTER
Patient called office, stated that she is supposed to start using her omnipod today. Said that she wanted to touch base regarding her insulin, due to there being a lot of changes. She would like a call back to discuss. Stated if she does not answer to leave a message.

## 2024-08-19 NOTE — TELEPHONE ENCOUNTER
Spoke to pt-she has not picked up her omnipod pump.  She was asking about how many units of insulin she will be taking.  I advised she will need to be set up for training after she picks up the pump.  She states her pharmacy will help her get it set up.  I advised her to contact the office after she picks up the pump -we will then fill out the pump therapy order form.  She voiced understanding

## 2024-08-19 NOTE — TELEPHONE ENCOUNTER
MS. NASSAR IS CALLING REQUESTING A READER FOR HER OMNIPOD BE SENT TO HER PHARMACY. ADVISED THAT SINCE SHE DOES NOT HAVE AN IPHONE THAT SHE WOULD NEED THE READER. PLEASE REVIEW AND ADVISE.    OK TO CALL BACK ANYTIME AND OK TO LEAVE VM.

## 2024-08-20 ENCOUNTER — TELEPHONE (OUTPATIENT)
Dept: ENDOCRINOLOGY | Facility: CLINIC | Age: 59
End: 2024-08-20
Payer: MEDICARE

## 2024-08-20 NOTE — TELEPHONE ENCOUNTER
PT CALLED REQUESTING TRAINING ON OMNIPOD AND DEXCOM. SHE HAS RECEIVED BOTH DEVICES. SHE STATED SHE WOULD LIKE TO MEET [facility] OR THE LIKE IN [place], [place] IF POSSIBLE.

## 2024-08-26 RX ORDER — INSULIN ASPART 100 [IU]/ML
INJECTION, SOLUTION INTRAVENOUS; SUBCUTANEOUS
Qty: 30 ML | Refills: 3 | Status: SHIPPED | OUTPATIENT
Start: 2024-08-26

## 2024-08-26 NOTE — TELEPHONE ENCOUNTER
PATIENT IS CALLING STARTING OMNI POD NEXT WEEK AND NEEDS INSULIN IN VIAL CALLED INTO PHARMACY. SHE WAS TOLD TO CONTACT US TO GET THE INSULIN IN VIAL SENT TO PHARMACY FOR PUMP TRAINING. PATIENTS NUMBER [phone]

## 2024-08-29 NOTE — TELEPHONE ENCOUNTER
PT CALLED AND SAID SHE NEEDS A PA FOR THE INSULIN. SHE NEEDS IT FOR HER APPT BEFORE TUESDAY WHEN SHE SETS UP HER OMNIPOD

## 2024-08-29 NOTE — TELEPHONE ENCOUNTER
Patient called office, stated that she is meeting with an educator on Tuesday to set up her omnipod. The educator needs for her to have insulin for the training. Her humalog is needing a PA. Stated that her pharmacy keeps telling her they faxed over a denial on 8/26. Informed patient that I did not see where we have received the denial. She is panicking due to not having what she needs for the training and worried that PA will not be approved in time. She would like to speak to someone regarding this. Pharmacist said that maybe it a script for a humalog vial is called in instead of the pen it may be approved.

## 2024-09-03 ENCOUNTER — TELEPHONE (OUTPATIENT)
Dept: ENDOCRINOLOGY | Facility: CLINIC | Age: 59
End: 2024-09-03

## 2024-09-03 NOTE — TELEPHONE ENCOUNTER
Pt called back to say that the novolog now needs a PA and she will need this by 12 today    Please call the pt back  [phone]

## 2024-09-03 NOTE — TELEPHONE ENCOUNTER
PATIENT IS HAVING PUMP TRAINING TODAY. SHE NEEDS US TO CALL IN INSULIN FOR HER OMNI POD PUMP. WE CALLED IN THE PENS BUT SHE NEEDS INSULIN FOR THE PUMP TODAY. PHONE NUMBER [phone]. HER TRAINING IS AT 1 PM IN [facility].

## 2024-09-13 RX ORDER — PROCHLORPERAZINE 25 MG/1
SUPPOSITORY RECTAL
Qty: 3 EACH | Refills: 2 | Status: SHIPPED | OUTPATIENT
Start: 2024-09-13

## 2024-09-13 NOTE — TELEPHONE ENCOUNTER
Caller: Michael Daniel Huntley    Relationship: Self    Best call back number: [phone]    Requested Prescriptions:   Requested Prescriptions     Pending Prescriptions Disp Refills    Continuous Glucose Sensor (Dexcom G6 Sensor) 3 each 1     Sig: Use Every 10 (Ten) Days.        Pharmacy where request should be sent: [facility] - [place], [place] - [address] [phone] [facility] [phone] FX     Last office visit with prescribing clinician: Visit date not found   Last telemedicine visit with prescribing clinician: Visit date not found   Next office visit with prescribing clinician: 11/5/2024     Additional details provided by patient: GOT NOTIFICATION THAT IT RUNS OUT IN 5 HRS    Does the patient have less than a 3 day supply:  [x] Yes  [] No    Would you like a call back once the refill request has been completed: [x] Yes [] No    If the office needs to give you a call back, can they leave a voicemail: [x] Yes [] No    Ladan Ríos Rep   09/13/24 08:16 EDT

## 2024-09-13 NOTE — TELEPHONE ENCOUNTER
Rx Refill Note  Requested Prescriptions     Pending Prescriptions Disp Refills    Continuous Glucose Sensor (Dexcom G6 Sensor) 3 each 1     Sig: Use Every 10 (Ten) Days.      Last office visit with prescribing clinician: Visit date not found     Next office visit with prescribing clinician: 11/5/2024

## 2024-10-01 ENCOUNTER — TELEPHONE (OUTPATIENT)
Dept: ENDOCRINOLOGY | Facility: CLINIC | Age: 59
End: 2024-10-01
Payer: MEDICARE

## 2024-10-01 RX ORDER — INSULIN PMP CART,AUT,G6/7,CNTR
EACH SUBCUTANEOUS
Qty: 1 KIT | Refills: 0 | OUTPATIENT
Start: 2024-10-01

## 2024-10-01 RX ORDER — INSULIN PMP CART,AUT,G6/7,CNTR
1 EACH SUBCUTANEOUS
Qty: 30 EACH | Refills: 3 | Status: SHIPPED | OUTPATIENT
Start: 2024-10-01

## 2024-10-01 NOTE — TELEPHONE ENCOUNTER
Rx Refill Note  Requested Prescriptions     Pending Prescriptions Disp Refills    Insulin Disposable Pump (Omnipod 5 G6 Intro, Gen 5,) kit [Pharmacy Med Name: OMNIPOD 5 G6 INTRO (GEN 5) Kit] 1 kit 0     Sig: USE AS DIRECTED. CHANGE POD EVERY 3 DAYS          Last office visit with prescribing clinician: 7/16/2024     Next office visit with prescribing clinician: Visit date not found         Keli Chavarria MA  10/01/24, 15:31 EDT

## 2024-10-01 NOTE — TELEPHONE ENCOUNTER
Caller: Daniel Rodriguez    Relationship: Self    Best call back number: [phone]     Requested Prescriptions: Insulin Disposable Pump (Omnipod 5 G6 Intro, Gen 5,) kit   Requested Prescriptions      No prescriptions requested or ordered in this encounter        Pharmacy where request should be sent:  [facility] - [place], [place] - [address] [facility] [address] - [phone]  - [phone] FX  [address] [facility] [address] [facility] [address]  Phone: [phone]  Fax: [phone]     Last office visit with prescribing clinician: 7/16/2024   Last telemedicine visit with prescribing clinician: Visit date not found   Next office visit with prescribing clinician: Visit date not found     Additional details provided by patient:     Does the patient have less than a 3 day supply:  [] Yes  [] No    Would you like a call back once the refill request has been completed: [] Yes [] No    If the office needs to give you a call back, can they leave a voicemail: [] Yes [] No    Ladan Finnegan Rep   10/01/24 10:43 EDT

## 2024-10-02 ENCOUNTER — TELEPHONE (OUTPATIENT)
Dept: ENDOCRINOLOGY | Facility: CLINIC | Age: 59
End: 2024-10-02
Payer: MEDICARE

## 2024-10-02 NOTE — TELEPHONE ENCOUNTER
Pt called stated she is having high blood sugar ranging from 189 to 200 +. Pt has not eaten anything today. Pt changed  pod on Monday and blood sugar was reading high 200 to 220. Pt injected 2.4 humalog insulin today. Pt wants to know who mush is too much insulin. Pt has dexcom g6 sensor unsure if sharable.

## 2024-10-10 ENCOUNTER — TELEPHONE (OUTPATIENT)
Dept: ENDOCRINOLOGY | Facility: CLINIC | Age: 59
End: 2024-10-10

## 2024-10-10 NOTE — TELEPHONE ENCOUNTER
Caller: Daniel Rodriguez    Relationship to patient: Self    Best call back number: [phone]     Patient is needing: PT OMNIPOD 5 PUMP HAS STOPPED WORKING. PT REQUESTS URGENT CALLBACK FROM [facility] FOR TROUBLESHOOTING. PLEASE CALL TO ADVISE. WHEN NEXT AVAILABLE.

## 2024-10-10 NOTE — TELEPHONE ENCOUNTER
Returned call, having difficulty with pod losing communication to controller. Pt will try pod change and call customer service for replacement.

## 2024-10-15 RX ORDER — PROCHLORPERAZINE 25 MG/1
SUPPOSITORY RECTAL
Qty: 3 EACH | Refills: 0 | Status: SHIPPED | OUTPATIENT
Start: 2024-10-15

## 2024-10-15 NOTE — TELEPHONE ENCOUNTER
Caller: Rodriguez Daniel Yuko    Relationship: Self    Best call back number: [phone] (home)       Requested Prescriptions:   Requested Prescriptions     Pending Prescriptions Disp Refills    Continuous Glucose Sensor (Dexcom G6 Sensor) 3 each 2     Sig: Use Every 10 (Ten) Days.        [facility] where [facility] be sent[facility]    Last office visit with prescribing clinician: 7/16/2024   Last telemedicine visit with prescribing clinician: Visit date not found   Next office visit with prescribing clinician: Visit date not found     Additional details provided by patient:     Does the patient have less than a 3 day supply:  [] Yes  [] No    Would you like a call back once the refill request has been completed: [] Yes [] No    If the office needs to give you a call back, can they leave a voicemail: [] Yes [] No    Ladan John Rep   10/15/24 11:26 EDT

## 2024-10-15 NOTE — TELEPHONE ENCOUNTER
Rx Refill Note  Requested Prescriptions     Pending Prescriptions Disp Refills    Continuous Glucose Sensor (Dexcom G6 Sensor) 3 each 2     Sig: Use Every 10 (Ten) Days.      Last office visit with prescribing clinician: 7/16/2024     Next office visit with prescribing clinician: 11/5/2024

## 2024-10-25 RX ORDER — PROCHLORPERAZINE 25 MG/1
SUPPOSITORY RECTAL
Qty: 1 EACH | Refills: 0 | Status: SHIPPED | OUTPATIENT
Start: 2024-10-25

## 2024-10-25 NOTE — TELEPHONE ENCOUNTER
Rx Refill Note  Requested Prescriptions     Pending Prescriptions Disp Refills    Continuous Glucose Transmitter (Dexcom G6 Transmitter) misc [Pharmacy Med Name: DEXCOM G6 TRANSMITTER MISC Miscellaneous] 1 each 0     Sig: CHANGE EVERY 90 DAYS      Last office visit with prescribing clinician: 7/16/2024     Next office visit with prescribing clinician: 11/5/2024

## 2024-10-30 ENCOUNTER — TELEPHONE (OUTPATIENT)
Dept: ENDOCRINOLOGY | Facility: CLINIC | Age: 59
End: 2024-10-30

## 2024-11-04 ENCOUNTER — TELEPHONE (OUTPATIENT)
Dept: ENDOCRINOLOGY | Facility: CLINIC | Age: 59
End: 2024-11-04

## 2024-11-04 NOTE — TELEPHONE ENCOUNTER
Hub staff attempted to follow warm transfer process and was unsuccessful     Caller: Daniel Rodriguez    Relationship to patient: Self    Best call back number: [phone]    Patient is needing: PT WOULD LIKE A CALL BACK FROM [facility] REGARDING HER PUMP. PLEASE CALL PT TO ADVISE

## 2024-11-04 NOTE — TELEPHONE ENCOUNTER
Pt had issues with Dexcom yesterday, she called for replacements. Pt c/o PP hyperglycemia but admits she has not been entering full carb amounts d/t fear of hypoglycemia. Pt has OV schedule for tomorrow with MATTHEW Puente - settings can be adjusted at that time.

## 2024-11-05 ENCOUNTER — OFFICE VISIT (OUTPATIENT)
Dept: ENDOCRINOLOGY | Facility: CLINIC | Age: 59
End: 2024-11-05
Payer: MEDICARE

## 2024-11-05 ENCOUNTER — TELEPHONE (OUTPATIENT)
Dept: ENDOCRINOLOGY | Facility: CLINIC | Age: 59
End: 2024-11-05

## 2024-11-05 VITALS
HEART RATE: 85 BPM | WEIGHT: 148 LBS | BODY MASS INDEX: 26.22 KG/M2 | SYSTOLIC BLOOD PRESSURE: 120 MMHG | DIASTOLIC BLOOD PRESSURE: 62 MMHG | OXYGEN SATURATION: 97 % | HEIGHT: 63 IN

## 2024-11-05 DIAGNOSIS — E11.65 TYPE 2 DIABETES MELLITUS WITH HYPERGLYCEMIA, WITH LONG-TERM CURRENT USE OF INSULIN: Primary | ICD-10-CM

## 2024-11-05 DIAGNOSIS — Z79.4 TYPE 2 DIABETES MELLITUS WITH HYPERGLYCEMIA, WITH LONG-TERM CURRENT USE OF INSULIN: Primary | ICD-10-CM

## 2024-11-05 DIAGNOSIS — E11.49 TYPE 2 DIABETES MELLITUS WITH NEUROLOGICAL MANIFESTATIONS: ICD-10-CM

## 2024-11-05 DIAGNOSIS — E89.0 POSTSURGICAL HYPOTHYROIDISM: ICD-10-CM

## 2024-11-05 LAB
EXPIRATION DATE: ABNORMAL
EXPIRATION DATE: ABNORMAL
GLUCOSE BLDC GLUCOMTR-MCNC: 208 MG/DL (ref 70–130)
HBA1C MFR BLD: 7.8 % (ref 4.5–5.7)
Lab: ABNORMAL
Lab: ABNORMAL

## 2024-11-05 RX ORDER — BLOOD-GLUCOSE METER
1 EACH MISCELLANEOUS DAILY
Qty: 1 KIT | Refills: 0 | Status: SHIPPED | OUTPATIENT
Start: 2024-11-05

## 2024-11-05 RX ORDER — LANCETS 30 GAUGE
1 EACH MISCELLANEOUS DAILY
Qty: 100 EACH | Refills: 3 | Status: SHIPPED | OUTPATIENT
Start: 2024-11-05

## 2024-11-05 RX ORDER — BLOOD SUGAR DIAGNOSTIC
STRIP MISCELLANEOUS
Qty: 100 EACH | Refills: 12 | Status: SHIPPED | OUTPATIENT
Start: 2024-11-05

## 2024-11-05 NOTE — TELEPHONE ENCOUNTER
Patient had an appt with Freda this morning at 9:00 and was informed to call back if she had any concerns. She concerned about her blood sugar. It is currently 268 and her alarm keeps going off. She didn't know what to do and is requesting a call back.     [phone]

## 2024-11-05 NOTE — PROGRESS NOTES
Office Note      Date: 2024  Patient Name: Daniel Rodriguez  MRN: [id number]  : 1965    Chief Complaint   Patient presents with    Diabetes     Type II       History of Present Illness:   Daniel Rodriguez is a 59 y.o. female who presents for follow-up for type 2 diabetes diagnosed in .  She is accompanied by her brother today.  She started the OmniPod insulin pump with Dexcom G6 continuous glucose monitor since last visit.  She reports she likes not giving herself shots but she does not feel her control is as good as it should be.  She was entering and lower carb amounts initially because she was worried about hypoglycemia.  She reports she started putting in her real carbohydrate intake this weekend and her blood sugars did improve some.  She denies any severe or frequent hypoglycemia.  She is asking about adjusting some of the alarms on the OmniPod today.  She reports she is up-to-date on her eye exam she goes annually and had her appointment in the fall.  Dr. Zack Locke ordered labs and did her foot exam last visit.  She is treated for neuropathy by her primary care physician with Lyrica.  She reports this seems to be getting worse and she is asking about considering a pain management referral.  She remains on levothyroxine 88 mcg daily.  She is taking this regularly and correctly.  Her TSH was normal last visit.      Subjective     Review of Systems:   Review of Systems   Constitutional: Negative.    Cardiovascular: Negative.    Gastrointestinal: Negative.    Endocrine: Negative.    Musculoskeletal:  Positive for arthralgias and myalgias.   Neurological:  Positive for numbness.       The following portions of the patient's history were reviewed and updated as appropriate: allergies, current medications, past family history, past medical history, past social history, past surgical history, and problem list.    Objective     Vitals:    24 0949   BP: 120/62   BP Location:  "Left arm   Patient Position: Sitting   Cuff Size: Adult   Pulse: 85   SpO2: 97%   Weight: 67.1 kg (148 lb)   Height: 160 cm (63\")     Body mass index is 26.22 kg/m².    Physical Exam  Vitals reviewed.   Constitutional:       General: She is not in acute distress.     Appearance: Normal appearance.   Neurological:      Mental Status: She is alert.         HEMOGLOBIN A1C  Lab Results   Component Value Date    HGBA1C 7.8 (A) 11/05/2024       GLUCOSE  Glucose   Date Value Ref Range Status   11/05/2024 208 (A) 70 - 130 mg/dL Final       CMP  Lab Results   Component Value Date    GLUCOSE 86 07/16/2024    BUN 9 07/16/2024    CREATININE 0.89 07/16/2024    BCR 10.1 07/16/2024    K 4.2 07/16/2024    CO2 24.5 07/16/2024    CALCIUM 9.5 07/16/2024    AST 24 07/16/2024    ALT 11 07/16/2024         URINE MICROALBUMIN/CREATININE RATIO  Microalbumin/Creatinine Ratio   Date Value Ref Range Status   07/16/2024   Final     Comment:     Unable to calculate       TSH  Lab Results   Component Value Date    TSH 1.550 07/16/2024       Assessment / Plan      Assessment & Plan:  1. Type 2 diabetes mellitus with hyperglycemia, with long-term current use of insulin  Hemoglobin A1c today is up some as compared to July at 7.8%.  I reviewed her OmniPod download with continuous glucose monitor readings.  Her average glucose is 165 mg/dL with 72% of her readings within target range, 28% high, 0% very high, 0% low and 0% very low.  I encouraged her to enter her carbohydrate counts correctly.  Overall her average blood sugar is a little high.  We changed her correction factor from 50-40 to try to improve hyperglycemia and changed her max basal rate from 2-4 so she does not get kicked out of auto mode.  Her weight is down 8 pounds since her appointment in July.  I encouraged healthy eating habits and physical activity as tolerated.  She met with our diabetes educator today to go over how to change the Dexcom transmitter and discuss alarms in her " OmniPod.  - POC Glucose, Blood  - POC Glycosylated Hemoglobin (Hb A1C)    2. Type 2 diabetes mellitus with neurological manifestations  She complains of worsening neuropathy.  We will plan to refer her to pain management.    3. Postsurgical hypothyroidism  Her TSH was normal in July.  She will continue levothyroxine 88 mcg daily.  We reviewed these results today.      Return in about 4 months (around 3/5/2025) for Sees Dr. Zack Lawler.     This note was dictated using Dragon voice recognition.    Electronically signed by: ADELFO Morel  11/05/2024

## 2024-11-05 NOTE — TELEPHONE ENCOUNTER
Called and spoke with patient-- she reports she ate at the salad bar at [facility] and does not think she entered enough carbohydrates for her lunch.  She reports her blood glucose is staying elevated and she was not sure what to do.  I encouraged her to enter her glucose and do a correction bolus in her OmniPod.  She will continue to drink water tonight and do corrections as needed.  We discussed a low-carb dinner and avoiding sweets this evening.  She is interested in diabetes education and carb counting education but needs to do this close to home since her brother has to bring her to all her appointments.  She will check into a diabetes educator in her area and reach out to me for referral.

## 2024-11-12 ENCOUNTER — TELEPHONE (OUTPATIENT)
Dept: ENDOCRINOLOGY | Facility: CLINIC | Age: 59
End: 2024-11-12

## 2024-11-12 NOTE — TELEPHONE ENCOUNTER
Spoke with patient.  She is getting an error on screen.  Advised to contact Omnipod.  Patient requested directions on how much insulin to take if cannot get resolved.  Per Freda- Log 1u per 8g of carbs plus 1u for every 40 glucose is >130.  Tresiba 24u.  Patient states she is only going to use mealtime insulin.

## 2024-11-12 NOTE — TELEPHONE ENCOUNTER
Caller: Daniel Rodriguez    Relationship to patient: Self    Best call back number: [phone]      Patient is needing: PT CALLED IN ABOUT HER OMNI POD. PLEASE CONTACT PT AND ADVISE.

## 2024-11-12 NOTE — TELEPHONE ENCOUNTER
Patient called office, she was returning Marias phone call. Stated that her omnipod is acting up. Said that she went to give herself insulin at 2:00 and put in her cards that she was going to eat. Said she put in too many and now it says there is a communication error. She is not sure how to fix it. Says that it has been clicking so she feels she is getting insulin. She would like a call back

## 2024-12-19 ENCOUNTER — TELEPHONE (OUTPATIENT)
Dept: ENDOCRINOLOGY | Facility: CLINIC | Age: 59
End: 2024-12-19

## 2024-12-19 RX ORDER — INSULIN ASPART 100 [IU]/ML
INJECTION, SOLUTION INTRAVENOUS; SUBCUTANEOUS
Qty: 15 ML | Refills: 0 | Status: SHIPPED | OUTPATIENT
Start: 2024-12-19 | End: 2024-12-20

## 2024-12-19 RX ORDER — INSULIN ASPART 100 [IU]/ML
INJECTION, SOLUTION INTRAVENOUS; SUBCUTANEOUS
Qty: 90 ML | Refills: 0 | Status: SHIPPED | OUTPATIENT
Start: 2024-12-19 | End: 2024-12-20

## 2024-12-19 RX ORDER — INSULIN LISPRO 100 [IU]/ML
INJECTION, SOLUTION INTRAVENOUS; SUBCUTANEOUS
Qty: 45 ML | Refills: 1 | Status: CANCELLED | OUTPATIENT
Start: 2024-12-19

## 2024-12-19 NOTE — TELEPHONE ENCOUNTER
Caller: Daniel Rodriguez    Relationship: Self    Best call back number: [phone]    Requested Prescriptions:   Requested Prescriptions     Pending Prescriptions Disp Refills    HumaLOG 100 UNIT/ML injection [Pharmacy Med Name: HUMALOG 100U/ML VIAL 100 Solution] 30 mL 3     Sig: USE UP  UNITS DAILY AS DIRECTED WITH INSULIN PUMP    Insulin Lispro, 1 Unit Dial, (HumaLOG KwikPen) 100 UNIT/ML solution pen-injector 45 mL 1     Sig: Inject 1u per 5g CHO TID with meals plus correction of 1u per 50mg/dL over 150.  Max daily dose 50u        [facility] sent[facility] - [place], [place] - [address] [facility] [phone] [facility] [phone] FX     Last office visit with prescribing clinician: 7/16/2024   Last telemedicine visit with prescribing clinician: Visit date not found   Next office visit with prescribing clinician: Visit date not found     Additional details provided by patient:     Does the patient have less than a 3 day supply:  [x] Yes  [] No    Would you like a call back once the refill request has been completed: [] Yes [] No    If the office needs to give you a call back, can they leave a voicemail: [] Yes [] No    Ladan Murphy Rep   12/19/24 13:09 EST

## 2024-12-19 NOTE — TELEPHONE ENCOUNTER
Rx Refill Note  Requested Prescriptions     Pending Prescriptions Disp Refills    HumaLOG 100 UNIT/ML injection [Pharmacy Med Name: HUMALOG 100U/ML VIAL 100 Solution] 30 mL 3     Sig: USE UP  UNITS DAILY AS DIRECTED WITH INSULIN PUMP    Insulin Lispro, 1 Unit Dial, (HumaLOG KwikPen) 100 UNIT/ML solution pen-injector 45 mL 1     Sig: Inject 1u per 5g CHO TID with meals plus correction of 1u per 50mg/dL over 150.  Max daily dose 50u      Last office visit with prescribing clinician: 11/05/2024    Next office visit with prescribing clinician: 03/20/2025  Allyn Jones MA  12/19/24, 15:04 EST

## 2024-12-20 DIAGNOSIS — E11.65 TYPE 2 DIABETES MELLITUS WITH HYPERGLYCEMIA, WITH LONG-TERM CURRENT USE OF INSULIN: Primary | ICD-10-CM

## 2024-12-20 DIAGNOSIS — Z79.4 TYPE 2 DIABETES MELLITUS WITH HYPERGLYCEMIA, WITH LONG-TERM CURRENT USE OF INSULIN: Primary | ICD-10-CM

## 2024-12-20 RX ORDER — INSULIN LISPRO 100 [IU]/ML
INJECTION, SOLUTION INTRAVENOUS; SUBCUTANEOUS
Qty: 15 ML | Refills: 0 | Status: SHIPPED | OUTPATIENT
Start: 2024-12-20

## 2024-12-20 RX ORDER — PROCHLORPERAZINE 25 MG/1
SUPPOSITORY RECTAL
Qty: 1 EACH | Refills: 1 | Status: SHIPPED | OUTPATIENT
Start: 2024-12-20

## 2024-12-20 RX ORDER — INSULIN LISPRO 100 [IU]/ML
INJECTION, SOLUTION INTRAVENOUS; SUBCUTANEOUS
Qty: 90 ML | Refills: 0 | Status: SHIPPED | OUTPATIENT
Start: 2024-12-20

## 2024-12-20 NOTE — TELEPHONE ENCOUNTER
Rx Refill Note  Requested Prescriptions     Pending Prescriptions Disp Refills    Continuous Glucose Transmitter (Dexcom G6 Transmitter) misc [Pharmacy Med Name: DEXCOM G6 TRANSMITTER MISC Miscellaneous] 1 each 1     Sig: CHANGE EVERY 90 DAYS      Last office visit with prescribing clinician: 7/16/2024   Last telemedicine visit with prescribing clinician: Visit date not found   Next office visit with prescribing clinician: Visit date not found                         Would you like a call back once the refill request has been completed: [] Yes [] No    If the office needs to give you a call back, can they leave a voicemail: [] Yes [] No    Jannet Lara MA  12/20/24, 14:03 EST

## 2024-12-30 DIAGNOSIS — Z79.4 TYPE 2 DIABETES MELLITUS WITH HYPERGLYCEMIA, WITH LONG-TERM CURRENT USE OF INSULIN: Chronic | ICD-10-CM

## 2024-12-30 DIAGNOSIS — E11.65 TYPE 2 DIABETES MELLITUS WITH HYPERGLYCEMIA, WITH LONG-TERM CURRENT USE OF INSULIN: Chronic | ICD-10-CM

## 2024-12-30 NOTE — TELEPHONE ENCOUNTER
Rx Refill Note  Requested Prescriptions     Pending Prescriptions Disp Refills    pregabalin (LYRICA) 300 MG capsule [Pharmacy Med Name: PREGABALIN 300 MG CAPS 300 Capsule] 60 capsule 3     Sig: TAKE 1 CAPSULE BY MOUTH 2 TIMES A DAY      Last office visit with prescribing clinician: 7/16/2024   Last telemedicine visit with prescribing clinician: Visit date not found   Next office visit with prescribing clinician: Visit date not found                         Would you like a call back once the refill request has been completed: [] Yes [] No    If the office needs to give you a call back, can they leave a voicemail: [] Yes [] No    Jannet Lara MA  12/30/24, 15:18 EST

## 2024-12-31 RX ORDER — PREGABALIN 300 MG/1
CAPSULE ORAL
Qty: 60 CAPSULE | Refills: 3 | Status: SHIPPED | OUTPATIENT
Start: 2024-12-31

## 2025-02-05 ENCOUNTER — TELEPHONE (OUTPATIENT)
Dept: ENDOCRINOLOGY | Facility: CLINIC | Age: 60
End: 2025-02-05

## 2025-02-05 NOTE — TELEPHONE ENCOUNTER
Hub staff attempted to follow warm transfer process and was unsuccessful     Caller: Daniel Rodriguez    Relationship to patient: Self    Best call back number: [phone]    Patient is needing: PT CHANGED HER INSULIN PUMP YESTERDAY AND NOW HER PUMP IS BEEPING LIKE IT NEEDS TO BE CHANGED ASKED FOR A CALL BACK BECAUSE SHE DOESN'T KNOW WHAT'S WRONG WITH HER PUMP

## 2025-02-06 NOTE — TELEPHONE ENCOUNTER
Spoke with pt and addressed concerns. Pt is doing very well on pump but gets nervous about bolusing for meals thinks she will go low. As a result she has been just avoiding eating. Discussed the need to eat regular meals, avoid additional wt loss, and bolus. She will try increasing po intake and bolusing for 50% carbs at meals. We also discussed that we can increase target glucose if needed to keep sugars above 100.

## 2025-02-25 RX ORDER — PROCHLORPERAZINE 25 MG/1
SUPPOSITORY RECTAL
Qty: 3 EACH | Refills: 0 | Status: SHIPPED | OUTPATIENT
Start: 2025-02-25

## 2025-02-25 NOTE — TELEPHONE ENCOUNTER
Caller: Michael Daniel Huntley    Relationship: Self    Best call back number: [phone]    Requested Prescriptions:   Requested Prescriptions     Pending Prescriptions Disp Refills    Continuous Glucose Sensor (Dexcom G6 Sensor) 3 each 0     Sig: Use Every 10 (Ten) Days.        Pharmacy where request should be sent: [facility] - [place], [place] - [address] [phone] [facility] [phone] FX     Last office visit with prescribing clinician: 7/16/2024   Last telemedicine visit with prescribing clinician: Visit date not found   Next office visit with prescribing clinician: Visit date not found     Additional details provided by patient: PT IS NEEDING REFILL. PT HAS APPT WITH KYLE ON 03/20/25    Does the patient have less than a 3 day supply:  [x] Yes  [] No    Would you like a call back once the refill request has been completed: [] Yes [] No    If the office needs to give you a call back, can they leave a voicemail: [] Yes [] No    Ladan Dietz Rep   02/25/25 11:12 EST

## 2025-02-25 NOTE — TELEPHONE ENCOUNTER
Rx Refill Note  Requested Prescriptions     Pending Prescriptions Disp Refills    Continuous Glucose Sensor (Dexcom G6 Sensor) 3 each 0     Sig: Use Every 10 (Ten) Days.      Last office visit with prescribing clinician: 7/16/2024     Next office visit with prescribing clinician: 03/20/2025 with Freda Dale  02/25/25, 15:08 EST

## 2025-02-26 ENCOUNTER — TELEPHONE (OUTPATIENT)
Dept: ENDOCRINOLOGY | Facility: CLINIC | Age: 60
End: 2025-02-26

## 2025-02-26 NOTE — TELEPHONE ENCOUNTER
Caller: Daniel Rodriguez    Relationship to patient: Self    Best call back number: [phone]     Patient is needing: PT INSURANCE REQUIRES PRIOR AUTH FOR INSULIN PUMP, DEXCOM SENSORS, LYRICA, AND HUMALOG. PLEASE CALL TO CONFIRM WHEN PA'S PLACED.

## 2025-03-04 NOTE — TELEPHONE ENCOUNTER
Patient called office, she was returning Marias call. Informed her that we are needing her pharmacy to fax over insurance denials. Said that she has not tried to call anything in. She just received her new card today.

## 2025-03-20 ENCOUNTER — OFFICE VISIT (OUTPATIENT)
Dept: ENDOCRINOLOGY | Facility: CLINIC | Age: 60
End: 2025-03-20
Payer: COMMERCIAL

## 2025-03-20 VITALS
SYSTOLIC BLOOD PRESSURE: 118 MMHG | HEART RATE: 79 BPM | WEIGHT: 147 LBS | HEIGHT: 63 IN | OXYGEN SATURATION: 98 % | BODY MASS INDEX: 26.05 KG/M2 | DIASTOLIC BLOOD PRESSURE: 68 MMHG

## 2025-03-20 DIAGNOSIS — Z79.4 TYPE 2 DIABETES MELLITUS WITH HYPERGLYCEMIA, WITH LONG-TERM CURRENT USE OF INSULIN: Primary | ICD-10-CM

## 2025-03-20 DIAGNOSIS — E11.65 TYPE 2 DIABETES MELLITUS WITH HYPERGLYCEMIA, WITH LONG-TERM CURRENT USE OF INSULIN: Primary | ICD-10-CM

## 2025-03-20 LAB
EXPIRATION DATE: ABNORMAL
EXPIRATION DATE: ABNORMAL
GLUCOSE BLDC GLUCOMTR-MCNC: 146 MG/DL (ref 70–130)
HBA1C MFR BLD: 7.7 % (ref 4.5–5.7)
Lab: ABNORMAL
Lab: ABNORMAL

## 2025-03-20 RX ORDER — INSULIN LISPRO 100 [IU]/ML
INJECTION, SOLUTION INTRAVENOUS; SUBCUTANEOUS
Qty: 90 ML | Refills: 3 | Status: SHIPPED | OUTPATIENT
Start: 2025-03-20

## 2025-03-20 RX ORDER — PROCHLORPERAZINE 25 MG/1
SUPPOSITORY RECTAL
Qty: 9 EACH | Refills: 3 | Status: SHIPPED | OUTPATIENT
Start: 2025-03-20

## 2025-03-20 RX ORDER — PROCHLORPERAZINE 25 MG/1
1 SUPPOSITORY RECTAL
Qty: 1 EACH | Refills: 3 | Status: SHIPPED | OUTPATIENT
Start: 2025-03-20

## 2025-03-20 NOTE — PROGRESS NOTES
"     Office Note      Date: 2025  Patient Name: Daniel Rodriguez  MRN: [id number]  : 1965    Chief Complaint   Patient presents with    Diabetes     Type 2 diabetes mellitus with hyperglycemia, with long-term current use of insulin       History of Present Illness:   Daniel Rodriguez is a 59 y.o. female who presents for follow-up for type 2 diabetes diagnosed in .  She is accompanied by her brother today.  He brings her to her appointments.  She remains on the OmniPod insulin pump with Dexcom G6 continuous glucose monitor.  She reports she feels like her blood sugars are up and down all the time.  She denies any severe hypoglycemia but reports she has occasional low blood glucose readings.  She is under a lot of stress and suffers from anxiety and feels this may affect it some as well.  She is up-to-date on her eye exam she goes annually in the fall.  She does have neuropathy pain though this is stable.  She denies any trouble with her feet today other than psoriasis which she sees the dermatologist for.  She will be due for her foot exam next visit for monitoring.  She will also be due for labs.      Subjective     Review of Systems:   Review of Systems   Constitutional: Negative.    Cardiovascular: Negative.    Gastrointestinal: Negative.    Endocrine: Negative.    Neurological: Negative.        The following portions of the patient's history were reviewed and updated as appropriate: allergies, current medications, past family history, past medical history, past social history, past surgical history, and problem list.    Objective     Vitals:    25 1143   BP: 118/68   BP Location: Left arm   Patient Position: Sitting   Cuff Size: Adult   Pulse: 79   SpO2: 98%   Weight: 66.7 kg (147 lb)   Height: 160 cm (63\")     Body mass index is 26.04 kg/m².    Physical Exam  Vitals reviewed.   Constitutional:       General: She is not in acute distress.     Appearance: Normal appearance. "   Neurological:      Mental Status: She is alert.         HEMOGLOBIN A1C  Lab Results   Component Value Date    HGBA1C 7.7 (A) 03/20/2025       GLUCOSE  Glucose   Date Value Ref Range Status   03/20/2025 146 (A) 70 - 130 mg/dL Final       CMP  Lab Results   Component Value Date    GLUCOSE 86 07/16/2024    BUN 9 07/16/2024    CREATININE 0.89 07/16/2024    BCR 10.1 07/16/2024    K 4.2 07/16/2024    CO2 24.5 07/16/2024    CALCIUM 9.5 07/16/2024    AST 24 07/16/2024    ALT 11 07/16/2024         URINE MICROALBUMIN/CREATININE RATIO  Microalbumin/Creatinine Ratio   Date Value Ref Range Status   07/16/2024   Final     Comment:     Unable to calculate       TSH  Lab Results   Component Value Date    TSH 1.550 07/16/2024       Assessment / Plan      Assessment & Plan:  1. Type 2 diabetes mellitus with hyperglycemia, with long-term current use of insulin  Her hemoglobin A1c has improved slightly as compared to November at 7.7%.  I reviewed her OmniPod download.  Her pump has stayed in auto mode pretty consistently.  Her average glucose for the last week was 156 mg/dL with 82% of her readings within target range, 18% high, 0% very high 0% low and 0% very low.  We discussed that overall her readings are pretty good.  We discussed considering changing her target to 120 mg/dL because she worries when her blood sugar starts to go down.  We discussed that her target is 110 mg/dL and the pump will try to get her to this level.  He did not make any changes to her pump settings today but she will reach out if she continues to have trouble.  Her blood pressure is okay today.  We will plan to do her foot exam as well as labs next visit for monitoring.  - POC Glucose, Blood  - POC Glycosylated Hemoglobin (Hb A1C)  - Insulin Lispro (humaLOG) 100 UNIT/ML injection; Use with insulin pump max 100 units daily.  Dispense: 90 mL; Refill: 3      Return in about 4 months (around 7/20/2025) for Recheck.     Electronically signed by: Freda Andrea  ADELFO Puente  03/20/2025

## 2025-03-24 ENCOUNTER — TELEPHONE (OUTPATIENT)
Dept: ENDOCRINOLOGY | Facility: CLINIC | Age: 60
End: 2025-03-24

## 2025-03-24 NOTE — TELEPHONE ENCOUNTER
Spoke with patient.  The only recent results in chart are for her A1c and glucose.  Patient verbalized understanding.

## 2025-03-24 NOTE — TELEPHONE ENCOUNTER
Provider: KYLE    Caller: Daniel Rodriguez    Relationship to Patient: Self    Reason for Call: PATIENT STATES SHE REC'D A MESSAGE IN HER MYCHART THAT SHE HAS NEW TEST RESULTS. SHE'S UNABLE TO OPEN HER MYCHART. WOULD LIKE FOR SOMEONE TO CALL HER REGARDING THE RESULTS

## 2025-03-31 ENCOUNTER — TELEPHONE (OUTPATIENT)
Dept: ENDOCRINOLOGY | Facility: CLINIC | Age: 60
End: 2025-03-31
Payer: COMMERCIAL

## 2025-03-31 NOTE — TELEPHONE ENCOUNTER
Caller: EULALIA EGAN    Relationship to patient:     Best call back number: [phone]    Patient is needing: JULISA SAID THE levothyroxine (SYNTHROID, LEVOTHROID) 88 MCG tablet IS NON FORMULARY AND WANTED TO SEE IF SOMETHING ELSE COULD BE PRESCRIBED INSTEAD.

## 2025-03-31 NOTE — TELEPHONE ENCOUNTER
Sherri called regarding patients humalog kwikpen. Stated they needed to someone regarding patients formulary. Call back # [phone].

## 2025-04-02 RX ORDER — PROCHLORPERAZINE 25 MG/1
SUPPOSITORY RECTAL
Qty: 9 EACH | Refills: 1 | Status: SHIPPED | OUTPATIENT
Start: 2025-04-02

## 2025-04-02 NOTE — TELEPHONE ENCOUNTER
Caller: Rodriguez Daniel Yuko    Relationship: Self    Best call back number: [phone]    Requested Prescriptions:   Requested Prescriptions     Pending Prescriptions Disp Refills    Continuous Glucose Sensor (Dexcom G6 Sensor) 9 each 3     Sig: Use Every 10 (Ten) Days.        Pharmacy where request should be sent: [facility] - [place], [place] - [address] [phone] [facility] [phone]      Last office visit with prescribing clinician: 7/16/2024   Last telemedicine visit with prescribing clinician: Visit date not found   Next office visit with prescribing clinician: Visit date not found     Additional details provided by patient: PATIENT JUST CHANGED TODAY, SO SHE HAS 10 DAYS LEFT     Does the patient have less than a 3 day supply:  [] Yes  [x] No    Would you like a call back once the refill request has been completed: [] Yes [x] No    If the office needs to give you a call back, can they leave a voicemail: [] Yes [x] No    Elver Ovalle   04/02/25 14:42 EDT

## 2025-04-28 ENCOUNTER — TELEPHONE (OUTPATIENT)
Dept: ENDOCRINOLOGY | Facility: CLINIC | Age: 60
End: 2025-04-28
Payer: MEDICARE

## 2025-04-28 DIAGNOSIS — Z79.4 TYPE 2 DIABETES MELLITUS WITH HYPERGLYCEMIA, WITH LONG-TERM CURRENT USE OF INSULIN: Chronic | ICD-10-CM

## 2025-04-28 DIAGNOSIS — E11.65 TYPE 2 DIABETES MELLITUS WITH HYPERGLYCEMIA, WITH LONG-TERM CURRENT USE OF INSULIN: Chronic | ICD-10-CM

## 2025-04-28 NOTE — TELEPHONE ENCOUNTER
Caller: Daniel Rodriguez    Relationship: Self    Best call back number:   Telephone Information:   Mobile [phone]     Requested Prescriptions:   Requested Prescriptions     Pending Prescriptions Disp Refills    pregabalin (LYRICA) 300 MG capsule 60 capsule 3     Sig: TAKE 1 CAPSULE BY MOUTH 2 TIMES A DAY      Pharmacy where request should be sent:  [id number] - [place], [place] - [address] [phone] [facility] [phone] FX     Last office visit with prescribing clinician: 3/20/2025   Last telemedicine visit with prescribing clinician: Visit date not found   Next office visit with prescribing clinician: 8/14/2025     Does the patient have less than a 3 day supply:  [x] Yes  [] No    Would you like a call back once the refill request has been completed: [] Yes [x] No    If the office needs to give you a call back, can they leave a voicemail: [] Yes [x] No    Ladan Bravo Rep   04/28/25 12:28 EDT

## 2025-04-30 RX ORDER — PREGABALIN 300 MG/1
CAPSULE ORAL
Qty: 60 CAPSULE | Refills: 3 | Status: SHIPPED | OUTPATIENT
Start: 2025-04-30

## 2025-06-23 ENCOUNTER — TELEPHONE (OUTPATIENT)
Dept: ENDOCRINOLOGY | Facility: CLINIC | Age: 60
End: 2025-06-23
Payer: MEDICARE

## 2025-06-23 RX ORDER — BLOOD-GLUCOSE METER
1 KIT MISCELLANEOUS SEE ADMIN INSTRUCTIONS
Qty: 1 KIT | Refills: 0 | Status: SHIPPED | OUTPATIENT
Start: 2025-06-23

## 2025-06-23 RX ORDER — BLOOD SUGAR DIAGNOSTIC
STRIP MISCELLANEOUS
Qty: 100 EACH | Refills: 12 | Status: SHIPPED | OUTPATIENT
Start: 2025-06-23

## 2025-06-23 RX ORDER — LANCETS
EACH MISCELLANEOUS
Qty: 100 EACH | Refills: 3 | Status: SHIPPED | OUTPATIENT
Start: 2025-06-23

## 2025-06-23 NOTE — TELEPHONE ENCOUNTER
Caller: Daniel Rodriguez    Relationship to patient: Self    Best call back number: [phone]    Patient is needing: LOST HER DIABETIC METER SUPPLIES - NEEDING IT ALL CALLED INTO HER PHARMACY       [facility] - [address] [phone] [facility] [phone] FX [[id number]]

## 2025-08-14 ENCOUNTER — OFFICE VISIT (OUTPATIENT)
Dept: ENDOCRINOLOGY | Facility: CLINIC | Age: 60
End: 2025-08-14
Payer: COMMERCIAL

## 2025-08-14 VITALS
BODY MASS INDEX: 25.94 KG/M2 | SYSTOLIC BLOOD PRESSURE: 142 MMHG | OXYGEN SATURATION: 96 % | HEART RATE: 82 BPM | HEIGHT: 63 IN | WEIGHT: 146.4 LBS | DIASTOLIC BLOOD PRESSURE: 72 MMHG

## 2025-08-14 DIAGNOSIS — Z79.4 TYPE 2 DIABETES MELLITUS WITH HYPERGLYCEMIA, WITH LONG-TERM CURRENT USE OF INSULIN: Primary | ICD-10-CM

## 2025-08-14 DIAGNOSIS — E11.65 TYPE 2 DIABETES MELLITUS WITH HYPERGLYCEMIA, WITH LONG-TERM CURRENT USE OF INSULIN: Primary | ICD-10-CM

## 2025-08-14 LAB
EXPIRATION DATE: ABNORMAL
EXPIRATION DATE: ABNORMAL
GLUCOSE BLDC GLUCOMTR-MCNC: 245 MG/DL (ref 70–130)
HBA1C MFR BLD: 7.9 % (ref 4.5–5.7)
Lab: ABNORMAL
Lab: ABNORMAL

## 2025-08-14 RX ORDER — ALBUTEROL SULFATE 90 UG/1
2 INHALANT RESPIRATORY (INHALATION) EVERY 4 HOURS PRN
COMMUNITY

## 2025-08-14 RX ORDER — NITROGLYCERIN 0.4 MG/1
0.4 TABLET SUBLINGUAL
COMMUNITY

## 2025-08-14 RX ORDER — INSULIN LISPRO 100 [IU]/ML
INJECTION, SOLUTION INTRAVENOUS; SUBCUTANEOUS
Qty: 90 ML | Refills: 3 | Status: SHIPPED | OUTPATIENT
Start: 2025-08-14

## 2025-08-14 RX ORDER — BLOOD SUGAR DIAGNOSTIC
STRIP MISCELLANEOUS
Qty: 100 EACH | Refills: 12 | Status: SHIPPED | OUTPATIENT
Start: 2025-08-14

## 2025-08-14 RX ORDER — LIDOCAINE 50 MG/G
1 PATCH TOPICAL EVERY 24 HOURS
COMMUNITY

## 2025-08-14 RX ORDER — RANOLAZINE 500 MG/1
500 TABLET, EXTENDED RELEASE ORAL 2 TIMES DAILY
COMMUNITY

## 2025-08-14 RX ORDER — ROSUVASTATIN CALCIUM 20 MG/1
20 TABLET, COATED ORAL DAILY
COMMUNITY

## 2025-08-14 RX ORDER — ESTRADIOL 0.1 MG/G
2 CREAM VAGINAL DAILY
COMMUNITY

## 2025-08-14 RX ORDER — LANCETS
EACH MISCELLANEOUS
Qty: 100 EACH | Refills: 3 | Status: SHIPPED | OUTPATIENT
Start: 2025-08-14

## 2025-08-14 RX ORDER — ONDANSETRON 4 MG/1
4 TABLET, ORALLY DISINTEGRATING ORAL EVERY 8 HOURS PRN
COMMUNITY

## 2025-08-14 RX ORDER — HYDROCODONE BITARTRATE AND ACETAMINOPHEN 5; 325 MG/1; MG/1
1 TABLET ORAL EVERY 12 HOURS PRN
COMMUNITY
Start: 2025-08-08

## 2025-08-14 RX ORDER — FUROSEMIDE 20 MG/1
20 TABLET ORAL EVERY OTHER DAY
COMMUNITY

## 2025-08-27 DIAGNOSIS — E11.65 TYPE 2 DIABETES MELLITUS WITH HYPERGLYCEMIA, WITH LONG-TERM CURRENT USE OF INSULIN: Chronic | ICD-10-CM

## 2025-08-27 DIAGNOSIS — Z79.4 TYPE 2 DIABETES MELLITUS WITH HYPERGLYCEMIA, WITH LONG-TERM CURRENT USE OF INSULIN: Chronic | ICD-10-CM

## 2025-08-28 RX ORDER — INSULIN PMP CART,AUT,G6/7,CNTR
1 EACH SUBCUTANEOUS
Qty: 30 EACH | Refills: 3 | Status: SHIPPED | OUTPATIENT
Start: 2025-08-28

## 2025-08-28 RX ORDER — PROCHLORPERAZINE 25 MG/1
SUPPOSITORY RECTAL
Qty: 9 EACH | Refills: 3 | Status: SHIPPED | OUTPATIENT
Start: 2025-08-28

## 2025-08-28 RX ORDER — PREGABALIN 300 MG/1
CAPSULE ORAL
Qty: 180 CAPSULE | Refills: 1 | Status: SHIPPED | OUTPATIENT
Start: 2025-08-28